# Patient Record
Sex: MALE | Race: WHITE | NOT HISPANIC OR LATINO | Employment: UNEMPLOYED | URBAN - METROPOLITAN AREA
[De-identification: names, ages, dates, MRNs, and addresses within clinical notes are randomized per-mention and may not be internally consistent; named-entity substitution may affect disease eponyms.]

---

## 2020-07-20 PROBLEM — M48.061 SPINAL STENOSIS OF LUMBAR REGION WITHOUT NEUROGENIC CLAUDICATION: Status: ACTIVE | Noted: 2020-07-20

## 2020-07-20 PROBLEM — M54.10 RADICULOPATHY OF LEG: Status: ACTIVE | Noted: 2020-07-20

## 2020-08-24 ENCOUNTER — HISTORICAL (OUTPATIENT)
Dept: RADIOLOGY | Facility: HOSPITAL | Age: 60
End: 2020-08-24

## 2020-09-10 ENCOUNTER — HISTORICAL (OUTPATIENT)
Dept: PHYSICAL THERAPY | Facility: HOSPITAL | Age: 60
End: 2020-09-10

## 2020-09-16 ENCOUNTER — HISTORICAL (OUTPATIENT)
Dept: PHYSICAL THERAPY | Facility: HOSPITAL | Age: 60
End: 2020-09-16

## 2020-09-18 ENCOUNTER — HISTORICAL (OUTPATIENT)
Dept: PHYSICAL THERAPY | Facility: HOSPITAL | Age: 60
End: 2020-09-18

## 2020-09-22 ENCOUNTER — HISTORICAL (OUTPATIENT)
Dept: PHYSICAL THERAPY | Facility: HOSPITAL | Age: 60
End: 2020-09-22

## 2020-09-24 ENCOUNTER — HISTORICAL (OUTPATIENT)
Dept: PHYSICAL THERAPY | Facility: HOSPITAL | Age: 60
End: 2020-09-24

## 2020-09-29 ENCOUNTER — HISTORICAL (OUTPATIENT)
Dept: PHYSICAL THERAPY | Facility: HOSPITAL | Age: 60
End: 2020-09-29

## 2020-10-01 ENCOUNTER — HISTORICAL (OUTPATIENT)
Dept: PHYSICAL THERAPY | Facility: HOSPITAL | Age: 60
End: 2020-10-01

## 2020-10-06 ENCOUNTER — HISTORICAL (OUTPATIENT)
Dept: PHYSICAL THERAPY | Facility: HOSPITAL | Age: 60
End: 2020-10-06

## 2020-10-08 ENCOUNTER — HISTORICAL (OUTPATIENT)
Dept: PHYSICAL THERAPY | Facility: HOSPITAL | Age: 60
End: 2020-10-08

## 2020-10-13 ENCOUNTER — HISTORICAL (OUTPATIENT)
Dept: PHYSICAL THERAPY | Facility: HOSPITAL | Age: 60
End: 2020-10-13

## 2020-10-15 ENCOUNTER — HISTORICAL (OUTPATIENT)
Dept: PHYSICAL THERAPY | Facility: HOSPITAL | Age: 60
End: 2020-10-15

## 2021-07-29 ENCOUNTER — HISTORICAL (OUTPATIENT)
Dept: WOUND CARE | Facility: HOSPITAL | Age: 61
End: 2021-07-29

## 2021-10-04 LAB — CRC RECOMMENDATION EXT: NORMAL

## 2022-04-09 ENCOUNTER — HISTORICAL (OUTPATIENT)
Dept: ADMINISTRATIVE | Facility: HOSPITAL | Age: 62
End: 2022-04-09

## 2022-04-25 VITALS
BODY MASS INDEX: 22.56 KG/M2 | HEIGHT: 67 IN | SYSTOLIC BLOOD PRESSURE: 119 MMHG | DIASTOLIC BLOOD PRESSURE: 74 MMHG | WEIGHT: 143.75 LBS | OXYGEN SATURATION: 99 %
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
## 2022-07-06 ENCOUNTER — PATIENT OUTREACH (OUTPATIENT)
Dept: ADMINISTRATIVE | Facility: HOSPITAL | Age: 62
End: 2022-07-06
Payer: MEDICAID

## 2022-07-06 NOTE — PROGRESS NOTES
Population Health Outreach. The following record(s)  below were uploaded for Health Maintenance .      COLONOSCOPY     10/04/2021

## 2022-07-11 DIAGNOSIS — Z00.00 ENCOUNTER FOR WELLNESS EXAMINATION IN ADULT: Primary | ICD-10-CM

## 2022-09-19 ENCOUNTER — TELEPHONE (OUTPATIENT)
Dept: FAMILY MEDICINE | Facility: CLINIC | Age: 62
End: 2022-09-19
Payer: MEDICAID

## 2022-09-19 ENCOUNTER — LAB VISIT (OUTPATIENT)
Dept: LAB | Facility: HOSPITAL | Age: 62
End: 2022-09-19
Attending: NURSE PRACTITIONER
Payer: MEDICAID

## 2022-09-19 DIAGNOSIS — Z00.00 ENCOUNTER FOR WELLNESS EXAMINATION IN ADULT: ICD-10-CM

## 2022-09-19 LAB
ALBUMIN SERPL-MCNC: 4.1 GM/DL (ref 3.4–4.8)
ALBUMIN/GLOB SERPL: 1.1 RATIO (ref 1.1–2)
ALP SERPL-CCNC: 103 UNIT/L (ref 40–150)
ALT SERPL-CCNC: 16 UNIT/L (ref 0–55)
AST SERPL-CCNC: 18 UNIT/L (ref 5–34)
BASOPHILS # BLD AUTO: 0.03 X10(3)/MCL (ref 0–0.2)
BASOPHILS NFR BLD AUTO: 0.7 %
BILIRUBIN DIRECT+TOT PNL SERPL-MCNC: 0.7 MG/DL
BUN SERPL-MCNC: 9.4 MG/DL (ref 8.4–25.7)
CALCIUM SERPL-MCNC: 10.3 MG/DL (ref 8.8–10)
CHLORIDE SERPL-SCNC: 104 MMOL/L (ref 98–107)
CHOLEST SERPL-MCNC: 149 MG/DL
CHOLEST/HDLC SERPL: 4 {RATIO} (ref 0–5)
CO2 SERPL-SCNC: 27 MMOL/L (ref 23–31)
CREAT SERPL-MCNC: 0.83 MG/DL (ref 0.73–1.18)
DEPRECATED CALCIDIOL+CALCIFEROL SERPL-MC: 61.2 NG/ML (ref 30–80)
EOSINOPHIL # BLD AUTO: 0.41 X10(3)/MCL (ref 0–0.9)
EOSINOPHIL NFR BLD AUTO: 9.8 %
ERYTHROCYTE [DISTWIDTH] IN BLOOD BY AUTOMATED COUNT: 11.8 % (ref 11.5–17)
GFR SERPLBLD CREATININE-BSD FMLA CKD-EPI: >60 MLS/MIN/1.73/M2
GLOBULIN SER-MCNC: 3.9 GM/DL (ref 2.4–3.5)
GLUCOSE SERPL-MCNC: 110 MG/DL (ref 82–115)
HCT VFR BLD AUTO: 48.9 % (ref 42–52)
HDLC SERPL-MCNC: 41 MG/DL (ref 35–60)
HGB BLD-MCNC: 15.7 GM/DL (ref 14–18)
IMM GRANULOCYTES # BLD AUTO: 0 X10(3)/MCL (ref 0–0.04)
IMM GRANULOCYTES NFR BLD AUTO: 0 %
LDLC SERPL CALC-MCNC: 91 MG/DL (ref 50–140)
LYMPHOCYTES # BLD AUTO: 1.59 X10(3)/MCL (ref 0.6–4.6)
LYMPHOCYTES NFR BLD AUTO: 37.9 %
MCH RBC QN AUTO: 32.4 PG (ref 27–31)
MCHC RBC AUTO-ENTMCNC: 32.1 MG/DL (ref 33–36)
MCV RBC AUTO: 101 FL (ref 80–94)
MONOCYTES # BLD AUTO: 0.34 X10(3)/MCL (ref 0.1–1.3)
MONOCYTES NFR BLD AUTO: 8.1 %
NEUTROPHILS # BLD AUTO: 1.8 X10(3)/MCL (ref 2.1–9.2)
NEUTROPHILS NFR BLD AUTO: 43.5 %
PLATELET # BLD AUTO: 243 X10(3)/MCL (ref 130–400)
PMV BLD AUTO: 9.2 FL (ref 7.4–10.4)
POTASSIUM SERPL-SCNC: 4.7 MMOL/L (ref 3.5–5.1)
PROT SERPL-MCNC: 8 GM/DL (ref 5.8–7.6)
PSA SERPL-MCNC: 1.18 NG/ML
RBC # BLD AUTO: 4.84 X10(6)/MCL (ref 4.7–6.1)
SODIUM SERPL-SCNC: 140 MMOL/L (ref 136–145)
TRIGL SERPL-MCNC: 87 MG/DL (ref 34–140)
TSH SERPL-ACNC: 1.11 UIU/ML (ref 0.35–4.94)
VLDLC SERPL CALC-MCNC: 17 MG/DL
WBC # SPEC AUTO: 4.2 X10(3)/MCL (ref 4.5–11.5)

## 2022-09-19 PROCEDURE — 36415 COLL VENOUS BLD VENIPUNCTURE: CPT

## 2022-09-19 PROCEDURE — 82306 VITAMIN D 25 HYDROXY: CPT

## 2022-09-19 PROCEDURE — 84443 ASSAY THYROID STIM HORMONE: CPT

## 2022-09-19 PROCEDURE — 84153 ASSAY OF PSA TOTAL: CPT

## 2022-09-19 PROCEDURE — 85025 COMPLETE CBC W/AUTO DIFF WBC: CPT

## 2022-09-19 PROCEDURE — 80053 COMPREHEN METABOLIC PANEL: CPT

## 2022-09-19 PROCEDURE — 80061 LIPID PANEL: CPT

## 2022-09-19 NOTE — TELEPHONE ENCOUNTER
Patient called for samples of Lisinopril 10mg.  His insurance will not go into effect until October 1st.  Instructed we do not provide samples but he can try using Good RX card at the pharmacy.  Agrees and voices understanding.

## 2022-09-19 NOTE — TELEPHONE ENCOUNTER
----- Message from Yasmin Mckeon sent at 9/19/2022  9:56 AM CDT -----  Regarding: rx refill unaffordable  Type:  Needs Medical Advice    Who Called: patient  Symptoms (please be specific):    How long has patient had these symptoms:    Pharmacy name and phone #:  [facility]  Would the patient rather a call back or a response via [facility]?   Best Call Back Number: [phone] or [phone]  Additional Information: Patient is not able to afford his medication at this time bc of insurance change. Patient is feeling sick without his medication. Insurance will not go into effect until 10/01/22. Does the clinic have any samples of lisinopriL 10 MG tablet? Please call patient back.

## 2022-09-22 ENCOUNTER — OFFICE VISIT (OUTPATIENT)
Dept: FAMILY MEDICINE | Facility: CLINIC | Age: 62
End: 2022-09-22
Payer: MEDICARE

## 2022-09-22 VITALS
OXYGEN SATURATION: 98 % | SYSTOLIC BLOOD PRESSURE: 130 MMHG | WEIGHT: 137.63 LBS | HEART RATE: 67 BPM | DIASTOLIC BLOOD PRESSURE: 88 MMHG | BODY MASS INDEX: 22.12 KG/M2 | HEIGHT: 66 IN | RESPIRATION RATE: 18 BRPM

## 2022-09-22 DIAGNOSIS — R79.9 ABNORMAL FINDING OF BLOOD CHEMISTRY, UNSPECIFIED: ICD-10-CM

## 2022-09-22 DIAGNOSIS — Z13.29 THYROID DISORDER SCREENING: ICD-10-CM

## 2022-09-22 DIAGNOSIS — Z12.5 ENCOUNTER FOR SCREENING FOR MALIGNANT NEOPLASM OF PROSTATE: ICD-10-CM

## 2022-09-22 DIAGNOSIS — N13.8 BPH WITH URINARY OBSTRUCTION: ICD-10-CM

## 2022-09-22 DIAGNOSIS — E55.9 VITAMIN D DEFICIENCY: ICD-10-CM

## 2022-09-22 DIAGNOSIS — I10 HYPERTENSION, UNSPECIFIED TYPE: ICD-10-CM

## 2022-09-22 DIAGNOSIS — N40.1 BPH WITH URINARY OBSTRUCTION: ICD-10-CM

## 2022-09-22 DIAGNOSIS — J30.2 SEASONAL ALLERGIES: ICD-10-CM

## 2022-09-22 DIAGNOSIS — Z72.0 TOBACCO USER: ICD-10-CM

## 2022-09-22 DIAGNOSIS — Z00.00 ENCOUNTER FOR MEDICARE ANNUAL WELLNESS EXAM: Primary | ICD-10-CM

## 2022-09-22 DIAGNOSIS — Z13.1 DIABETES MELLITUS SCREENING: ICD-10-CM

## 2022-09-22 DIAGNOSIS — K21.9 GASTROESOPHAGEAL REFLUX DISEASE, UNSPECIFIED WHETHER ESOPHAGITIS PRESENT: ICD-10-CM

## 2022-09-22 DIAGNOSIS — G89.29 CHRONIC LEFT-SIDED LOW BACK PAIN WITH LEFT-SIDED SCIATICA: ICD-10-CM

## 2022-09-22 DIAGNOSIS — M54.42 CHRONIC LEFT-SIDED LOW BACK PAIN WITH LEFT-SIDED SCIATICA: ICD-10-CM

## 2022-09-22 DIAGNOSIS — Z87.891 PERSONAL HISTORY OF TOBACCO USE, PRESENTING HAZARDS TO HEALTH: ICD-10-CM

## 2022-09-22 DIAGNOSIS — M75.100 ROTATOR CUFF SYNDROME, UNSPECIFIED LATERALITY: ICD-10-CM

## 2022-09-22 DIAGNOSIS — Z86.010 HISTORY OF COLONIC POLYPS: ICD-10-CM

## 2022-09-22 DIAGNOSIS — Z13.6 ENCOUNTER FOR LIPID SCREENING FOR CARDIOVASCULAR DISEASE: ICD-10-CM

## 2022-09-22 DIAGNOSIS — Z13.220 ENCOUNTER FOR LIPID SCREENING FOR CARDIOVASCULAR DISEASE: ICD-10-CM

## 2022-09-22 PROCEDURE — G0439 PR MEDICARE ANNUAL WELLNESS SUBSEQUENT VISIT: ICD-10-PCS | Mod: ,,, | Performed by: PHYSICIAN ASSISTANT

## 2022-09-22 PROCEDURE — G0439 PPPS, SUBSEQ VISIT: HCPCS | Mod: ,,, | Performed by: PHYSICIAN ASSISTANT

## 2022-09-22 RX ORDER — PANTOPRAZOLE SODIUM 40 MG/1
40 TABLET, DELAYED RELEASE ORAL DAILY
Qty: 90 TABLET | Refills: 1 | Status: SHIPPED | OUTPATIENT
Start: 2022-09-22 | End: 2023-09-25 | Stop reason: SDUPTHER

## 2022-09-22 RX ORDER — FINASTERIDE 5 MG/1
5 TABLET, FILM COATED ORAL DAILY
COMMUNITY
Start: 2021-07-29 | End: 2023-09-25

## 2022-09-22 RX ORDER — DULOXETIN HYDROCHLORIDE 20 MG/1
20 CAPSULE, DELAYED RELEASE ORAL 2 TIMES DAILY
Qty: 180 CAPSULE | Refills: 1 | Status: SHIPPED | OUTPATIENT
Start: 2022-09-22 | End: 2023-09-25

## 2022-09-22 RX ORDER — LORATADINE 10 MG/1
TABLET ORAL
Qty: 90 TABLET | Refills: 1 | OUTPATIENT
Start: 2022-09-22 | End: 2023-08-16 | Stop reason: SDUPTHER

## 2022-09-22 RX ORDER — ALFUZOSIN HYDROCHLORIDE 10 MG/1
10 TABLET, EXTENDED RELEASE ORAL
COMMUNITY
Start: 2022-04-28 | End: 2023-09-25

## 2022-09-22 RX ORDER — GABAPENTIN 300 MG/1
300 CAPSULE ORAL 3 TIMES DAILY
COMMUNITY
Start: 2022-08-31 | End: 2022-09-22 | Stop reason: SDUPTHER

## 2022-09-22 RX ORDER — LORATADINE 10 MG/1
TABLET ORAL
COMMUNITY
End: 2022-09-22 | Stop reason: SDUPTHER

## 2022-09-22 RX ORDER — DULOXETIN HYDROCHLORIDE 20 MG/1
20 CAPSULE, DELAYED RELEASE ORAL 2 TIMES DAILY
COMMUNITY
Start: 2022-07-27 | End: 2022-09-22 | Stop reason: SDUPTHER

## 2022-09-22 RX ORDER — LISINOPRIL 10 MG/1
10 TABLET ORAL DAILY
Qty: 30 TABLET | Refills: 0 | Status: SHIPPED | OUTPATIENT
Start: 2022-09-22 | End: 2023-03-23

## 2022-09-22 RX ORDER — TRAMADOL HYDROCHLORIDE 50 MG/1
TABLET ORAL
COMMUNITY
End: 2023-02-09

## 2022-09-22 RX ORDER — GABAPENTIN 300 MG/1
300 CAPSULE ORAL 3 TIMES DAILY
Qty: 270 CAPSULE | Refills: 1 | Status: SHIPPED | OUTPATIENT
Start: 2022-09-22 | End: 2023-09-25 | Stop reason: SDUPTHER

## 2022-09-22 RX ORDER — PANTOPRAZOLE SODIUM 40 MG/1
40 TABLET, DELAYED RELEASE ORAL
COMMUNITY
Start: 2022-01-19 | End: 2022-09-22 | Stop reason: SDUPTHER

## 2022-09-22 NOTE — PROGRESS NOTES
Patient Information      Patient Name: Teo Santiago     Date of service:  9/22/22      Member ID: [id number] - (Medicare)    YOB: 1960   Gender: male   Race: White      Ethnicity: Not  or /a   Medical Record Number: [id number]      History of Present Illness      Chief Complaint: wellness (Discuss lab results)    HPI:  Patient is a 61 y.o. year old male who presents to clinic for annual Medicare Wellness examination. Patient has past medical history of hypertension, seasonal allergies, chronic back pain, and depression. Diet described as on healthy. Stays active throughout the day. Wears dentures. Sleep quality described as good. Current smoker 1/2 PPD for the past 40 years).  Denies alcohol use. States  he needs to schedule for annual eye exam.    Review of Systems:  General: Denies fever, chills, fatigue, myalgias, and change in appetite   Eyes: Denies change in vision, eye redness, eye drainage, eye pain  ENT: Denies ear pain or pressure, rhinorrhea, nasal congestion, sore throat, and trouble swallowing  Resp: Denies wheezing, and shortness of breath   Cardio: Denies chest pain, palpitations, pleuritic pain, and edema   GI: Denies nausea, vomiting, diarrhea, and abdominal pain   : Denies dysuria, hematuria, and discharge   MSK: Denies trauma, joint pain, and trouble ambulating   Neuro: Denies LOC, dizziness, seizure like activity, and focal deficits   Skin: Denies rashes, open lesions and ulcers      Previous History    Patient Care Team:  Primary Doctor No as PCP - General     Review of patient's allergies indicates:  No Known Allergies  Outpatient Medications Marked as Taking for the 9/22/22 encounter (Office Visit) with MARISELA OTTO [facility]   Medication Sig Dispense Refill    alfuzosin (UROXATRAL) 10 mg Tb24       finasteride (PROSCAR) 5 mg tablet       traMADoL (ULTRAM) 50 mg tablet TAKE 1 TABLET BY MOUTH EVERY 4 HOURS AS NEEDED FOR PAIN FOR 5 DAYS       [DISCONTINUED] DULoxetine (CYMBALTA) 20 MG capsule Take 20 mg by mouth 2 (two) times daily.      [DISCONTINUED] gabapentin (NEURONTIN) 300 MG capsule Take 300 mg by mouth 3 (three) times daily.      [DISCONTINUED] lisinopriL 10 MG tablet Take 1 tablet (10 mg total) by mouth once daily. 30 tablet 0    [DISCONTINUED] loratadine (CLARITIN) 10 mg tablet TAKE ONE TABLET BY MOUTH DAILY FOR ALLERGY SYMPTOMS      [DISCONTINUED] pantoprazole (PROTONIX) 40 MG tablet Take 40 mg by mouth.       Past Medical History:   Diagnosis Date    BPH (benign prostatic hyperplasia)     GERD (gastroesophageal reflux disease)     Hypertension      Current Outpatient Medications   Medication Instructions    alfuzosin (UROXATRAL) 10 mg Tb24 No dose, route, or frequency recorded.    DULoxetine (CYMBALTA) 20 mg, Oral, 2 times daily    finasteride (PROSCAR) 5 mg tablet No dose, route, or frequency recorded.    gabapentin (NEURONTIN) 300 mg, Oral, 3 times daily    lisinopriL 10 mg, Oral, Daily    loratadine (CLARITIN) 10 mg tablet TAKE ONE TABLET BY MOUTH DAILY FOR ALLERGY SYMPTOMS<BR>Strength: 10 mg    pantoprazole (PROTONIX) 40 mg, Oral, Daily    traMADoL (ULTRAM) 50 mg tablet TAKE 1 TABLET BY MOUTH EVERY 4 HOURS AS NEEDED FOR PAIN FOR 5 DAYS     Past Surgical History:   Procedure Laterality Date    SPINE SURGERY  06/09/2020    METRX DLL LEFT L4/5, 06/09/20, [facility], DR. PONCE.    TONSILLECTOMY        History reviewed. No pertinent family history.    Social History     Tobacco Use    Smoking status: Every Day     Packs/day: 0.50     Years: 38.00     Pack years: 19.00     Types: Cigarettes    Smokeless tobacco: Never   Substance Use Topics    Alcohol use: Yes     Comment: socially    Drug use: Not Currently     Types: Cocaine, Methamphetamines      Health Maintenance      Health Maintenance   Topic Date Due    Hepatitis C Screening  Never done    Lipid Panel  09/19/2027    TETANUS VACCINE  07/28/2031      Patient Reported Health Risk Assessment       What is your age?: 65-69 (61)  Are you male or female?: Male  During the past four weeks, how much have you been bothered by emotional problems such as feeling anxious, depressed, irritable, sad, or downhearted and blue?: Not at all  During the past five weeks, has your physical and/or emotional health limited your social activities with family, friends, neighbors, or groups?: Not at all  During the past four weeks, how much bodily pain have you generally had?: Very mild pain  During the past four weeks, was someone available to help if you needed and wanted help?: Yes, quite a bit  During the past four weeks, what was the hardest physical activity you could do for at least two minutes?: Light  Can you get to places out of walking distance without help?  (For example, can you travel alone on buses or taxis, or drive your own car?): Yes  Can you go shopping for groceries or clothes without someone's help?: Yes  Can you prepare your own meals?: Yes  Can you do your own housework without help?: Yes  Because of any health problems, do you need the help of another person with your personal care needs such as eating, bathing, dressing, or getting around the house?: No  Can you handle your own money without help?: Yes  During the past four weeks, how would you rate your health in general?: Very good  How have things been going for you during the past four weeks?: Pretty well  Are you having difficulties driving your car?: No  Do you always fasten your seat belt when you are in a car?: Yes, usually  How often in the past four weeks have you been bothered by falling or dizzy when standing up?: Never  How often in the past four weeks have you been bothered by sexual problems?: Sometimes  How often in the past four weeks have you been bothered by trouble eating well?: Never  How often in the past four weeks have you been bothered by teeth or denture problems?: Never  How often in the past four weeks have you been bothered  with problems using the telephone?: Never  How often in the past four weeks have you been bothered by tiredness or fatigue?: Never  Have you fallen two or more times in the past year?: Yes  Are you afraid of falling?: No  Are you a smoker?: Yes, I'm not ready to quit  During the past four weeks, how many drinks of wine, beer, or other alcoholic beverages did you have?: 2-5 drinks per weeks  Do you exercise for about 20 minutes three or more days a week?: No, I usually do not exercise this much  Have you been given any information to help you with hazards in your house that might hurt you?: No  Have you been given any information to help you with keeping track of your medications?: No  How often do you have trouble taking medicines the way you've been told to take them?: I always take them as prescribed  How confident are you that you can control and manage most of your health problems?: Very confident  What is your race? (Check all that apply.):     No flowsheet data found.  Fall Risk Assessment - Outpatient 9/22/2022   Mobility Status Ambulatory   Number of falls 2+   Identified as fall risk 1             Depression Screening  Over the past two weeks, has the patient felt down, depressed, or hopeless?: No  Over the past two weeks, has the patient felt little interest or pleasure in doing things?: No  Functional Ability/Safety Screening  Was the patient's timed Up & Go test unsteady or longer than 30 seconds?: No  Does the patient need help with phone, transportation, shopping, preparing meals, housework, laundry, meds, or managing money?: No  Does the patient's home have rugs in the hallway, lack grab bars in the bathroom, lack handrails on the stairs or have poor lighting?: No  Have you noticed any hearing difficulties?: No  Cognitive Function (Assessed through direct observation with due consideration of information obtained by way of patient reports and/or concerns raised by family, friends, caretakers,  or others)    Does the patient repeat questions/statements in the same day?: No  Does the patient have trouble remembering the date, year, and time?: No  Does the patient have difficulty managing finances?: No  Does the patient have a decreased sense of direction?: No    Opioid Screening: Patient medication list reviewed, patient is not taking prescription opioids. Patient is not using additional opioids than prescribed. Patient is at low risk of substance abuse based on this opioid use history.      Screening      Prostate Cancer Screening:   PSA performed and listed below  Lab Results   Component Value Date    PSA 1.18 09/19/2022     Colorectal Cancer Screening:   The most recent Colorectal Cancer Screening test was abnormal and personal history of polyps or colon cancer. Colonoscopy performed 10/04/2021. Reccomended repeat colonoscopy at a 3-year interval given the poor prep and presence of polyps.    Lung Cancer Screening:   The patient has the following risk factor(s) for Lung Cancer: patient is between 50-80 years of age, has 20+ pack years of tobacco smoking, no CT of chest in the previous 24 months, and will order and schedule low dose CT.    Social History     Tobacco Use   Smoking Status Every Day    Packs/day: 0.50    Years: 38.00    Pack years: 19.00    Types: Cigarettes   Smokeless Tobacco Never      Cardiovascular Screening:  The patient has the following risk factor(s) for Cardiovascular Disease: patient's blood pressure is controlled and currently not on lipid lowering medications  Lab Results   Component Value Date    CHOL 149 09/19/2022    TRIG 87 09/19/2022    HDL 41 09/19/2022    TOTALCHOLEST 4 09/19/2022      Advanced directives (Living Will)     During this visit, I engaged the patient in the advance care planning process.  The patient and I reviewed the role for advance directives and their purpose in directing future healthcare if the patients unable to speak for him/herself.  At this point  "in time, the patient does have full decision-making capacity. We discussed different extreme health states that he could experience, and reviewed what kind of medical care he would want in those situations. The patient communicated that if he were comatose and had little chance of a meaningful recovery, he would not want machines/life-sustaining treatments used.  The patient has not completed a living will to reflect these preferences.  The patient has not already designated a healthcare power of  to make decisions on his behalf. I spent a total of 5 minutes engaging the patient in this advance care planning discussion.     Physical Exam      Vital Signs Reviewed   /88   Pulse 67   Resp 18   Ht 5' 6" (1.676 m)   Wt 62.4 kg (137 lb 9.6 oz)   SpO2 98%   BMI 22.21 kg/m²     Physical Exam:  General: Alert, well nourished, no acute distress, non-toxic appearing.   Eyes: Anicteric sclera, without conjunctival injection, normal lids, no purulent drainage, EOMs grossly intact.   Ears: No tragal tenderness. Tympanic membranes intact, pearly grey, without effusion or erythema and with a positive light reflex.   Mouth: Posterior pharynx without erythema. No exudate, ulcerations, or lesion. No tonsillar swelling.   Neck: Supple, full ROM, no rigidity, no cervical adenopathy.   Cardio: Normal rate and rhythm without murmurs, gallops, or rubs.   Resp: Respirations even and unlabored, clear to auscultation bilaterally.   Skin: No rashes or open lesions noted.   MSK: No swelling. No abrasions or signs of trauma. Ambulating without assistance.   Neuro: CN1-12 intact grossly. No focal deficits noted. Facial expressions even.     How is your physical exam and mental status from last year? unchanged     Labs     Results for orders placed or performed in visit on 09/19/22   Comprehensive Metabolic Panel   Result Value Ref Range    Sodium Level 140 136 - 145 mmol/L    Potassium Level 4.7 3.5 - 5.1 mmol/L    Chloride " 104 98 - 107 mmol/L    Carbon Dioxide 27 23 - 31 mmol/L    Glucose Level 110 82 - 115 mg/dL    Blood Urea Nitrogen 9.4 8.4 - 25.7 mg/dL    Creatinine 0.83 0.73 - 1.18 mg/dL    Calcium Level Total 10.3 (H) 8.8 - 10.0 mg/dL    Protein Total 8.0 (H) 5.8 - 7.6 gm/dL    Albumin Level 4.1 3.4 - 4.8 gm/dL    Globulin 3.9 (H) 2.4 - 3.5 gm/dL    Albumin/Globulin Ratio 1.1 1.1 - 2.0 ratio    Bilirubin Total 0.7 <=1.5 mg/dL    Alkaline Phosphatase 103 40 - 150 unit/L    Alanine Aminotransferase 16 0 - 55 unit/L    Aspartate Aminotransferase 18 5 - 34 unit/L    eGFR >60 mls/min/1.73/m2   Lipid Panel   Result Value Ref Range    Cholesterol Total 149 <=200 mg/dL    HDL Cholesterol 41 35 - 60 mg/dL    Triglyceride 87 34 - 140 mg/dL    Cholesterol/HDL Ratio 4 0 - 5    Very Low Density Lipoprotein 17     LDL Cholesterol 91.00 50.00 - 140.00 mg/dL   Vitamin D   Result Value Ref Range    Vit D 25 OH 61.2 30.0 - 80.0 ng/mL   TSH   Result Value Ref Range    Thyroid Stimulating Hormone 1.1060 0.3500 - 4.9400 uIU/mL   PSA, Screening   Result Value Ref Range    Prostate Specific Antigen 1.18 <=4.00 ng/mL   CBC with Differential   Result Value Ref Range    WBC 4.2 (L) 4.5 - 11.5 x10(3)/mcL    RBC 4.84 4.70 - 6.10 x10(6)/mcL    Hgb 15.7 14.0 - 18.0 gm/dL    Hct 48.9 42.0 - 52.0 %    .0 (H) 80.0 - 94.0 fL    MCH 32.4 (H) 27.0 - 31.0 pg    MCHC 32.1 (L) 33.0 - 36.0 mg/dL    RDW 11.8 11.5 - 17.0 %    Platelet 243 130 - 400 x10(3)/mcL    MPV 9.2 7.4 - 10.4 fL    Neut % 43.5 %    Lymph % 37.9 %    Mono % 8.1 %    Eos % 9.8 %    Basophil % 0.7 %    Lymph # 1.59 0.6 - 4.6 x10(3)/mcL    Neut # 1.8 (L) 2.1 - 9.2 x10(3)/mcL    Mono # 0.34 0.1 - 1.3 x10(3)/mcL    Eos # 0.41 0 - 0.9 x10(3)/mcL    Baso # 0.03 0 - 0.2 x10(3)/mcL    IG# 0.00 0 - 0.04 x10(3)/mcL    IG% 0.0 %      Assessment/Plan        1. Encounter for Medicare annual wellness exam   Overall health status reviewed. Significant chronic conditions addressed, including ongoing treatment  plans. Good health habits reinforced. Cardiovascular disease risk factors discussed.  Appropriate recommendations and preventative care medical information provided with annual wellness exams encouraged.     2. BPH with urinary obstruction   Uroxatral and finasteride  Continue follow-up and recommendations per Urology Dr. Alegre  Follow-up with urology on 11/03/2022 as planned     3. Hypertension, unspecified type   Denies headaches, blurred vision, dizziness, chest pain, SOB, headache, vision changes, palpitations, and  peripheral edema.    Controlled with medication. Compliant with medication. Continue medication daily. Low-salt/DASH diet. Discussed lifestyle modifications. Encouraged aerobic exercise at least 30 minutes a day. Monitor BP with daily blood pressure goal less than 130/90.      4. Tobacco user   Current smoker 1/2 PPD for the past 40 years).    Low-dose lung CT discussed and ordered in clinic today     5. History of colonic polyps   Colonoscopy performed 10/04/2021. Reccomended repeat colonoscopy at a 3-year interval given the poor prep and presence of polyps.     6. Chronic left-sided low back pain with left-sided sciatica  Reports chronic low back pain with occasional left-sided sciatica. Surgery performed 2019 by Dr. Olguin.  Denies cauda equina symptoms.     7. Rotator cuff syndrome  Continue follow-up and recommendations per Orthopedics at [facility].         Follow-up in six months.  Annual exam scheduled.  Labs will need to be ordered at next visit.    Orders Placed This Encounter    CT Chest Low Dose Diagnostic    loratadine (CLARITIN) 10 mg tablet    lisinopriL 10 MG tablet    DULoxetine (CYMBALTA) 20 MG capsule    gabapentin (NEURONTIN) 300 MG capsule    pantoprazole (PROTONIX) 40 MG tablet      Medication List with Changes/Refills   Current Medications    ALFUZOSIN (UROXATRAL) 10 MG TB24        FINASTERIDE (PROSCAR) 5 MG TABLET        TRAMADOL (ULTRAM) 50 MG TABLET    TAKE 1 TABLET BY MOUTH EVERY 4  HOURS AS NEEDED FOR PAIN FOR 5 DAYS   Changed and/or Refilled Medications    Modified Medication Previous Medication    DULOXETINE (CYMBALTA) 20 MG CAPSULE DULoxetine (CYMBALTA) 20 MG capsule       Take 1 capsule (20 mg total) by mouth 2 (two) times daily.    Take 20 mg by mouth 2 (two) times daily.    GABAPENTIN (NEURONTIN) 300 MG CAPSULE gabapentin (NEURONTIN) 300 MG capsule       Take 1 capsule (300 mg total) by mouth 3 (three) times daily.    Take 300 mg by mouth 3 (three) times daily.    LISINOPRIL 10 MG TABLET lisinopriL 10 MG tablet       Take 1 tablet (10 mg total) by mouth once daily.    Take 1 tablet (10 mg total) by mouth once daily.    LORATADINE (CLARITIN) 10 MG TABLET loratadine (CLARITIN) 10 mg tablet       TAKE ONE TABLET BY MOUTH DAILY FOR ALLERGY SYMPTOMS  Strength: 10 mg    TAKE ONE TABLET BY MOUTH DAILY FOR ALLERGY SYMPTOMS    PANTOPRAZOLE (PROTONIX) 40 MG TABLET pantoprazole (PROTONIX) 40 MG tablet       Take 1 tablet (40 mg total) by mouth once daily.    Take 40 mg by mouth.     Medicare Annual Wellness and Personalized Prevention Plan:   Fall Risk + Home Safety + Hearing Impairment + Depression Screen + Cognitive Impairment Screen + Health Risk Assessment all reviewed.     Health Maintenance Topics with due status: Not Due       Topic Last Completion Date    TETANUS VACCINE 07/28/2021    Colorectal Cancer Screening 10/04/2021    Lipid Panel 09/19/2022      The patient's Health Maintenance was reviewed and the following appears to be due at this time:   Health Maintenance Due   Topic Date Due    Hepatitis C Screening  Never done    Pneumococcal Vaccines (Age 0-64) (1 - PCV) Never done    HIV Screening  Never done    Shingles Vaccine (1 of 2) Never done    COVID-19 Vaccine (4 - Booster) 04/21/2022    Influenza Vaccine (1) Never done     Advance Care Planning   I attest to discussing Advance Care Planning with patient and/or family member.  Education was provided including the importance of the  Health Care Power of , Advance Directives, and/or [place] documentation.  The patient expressed understanding to the importance of this information and discussion.       Follow up in about 6 months (around 3/22/2023). In addition to their scheduled follow up, the patient has also been instructed to follow up on as needed basis.     Make sure to schedule all health maintenance appointments to achieve health care goals.   Annual check up is due every 12 months with your designated provider/care team.    Kristyn Everett PA-C    Future Appointments   Date Time Provider Department Center   11/3/2022  8:00 AM Yasmin Lugo DO [facility]    3/22/2023  8:40 AM ANDREW Hughes   9/20/2023  8:20 AM ANDREW Hughes

## 2022-10-21 PROBLEM — Z87.891 PERSONAL HISTORY OF TOBACCO USE, PRESENTING HAZARDS TO HEALTH: Status: ACTIVE | Noted: 2022-10-21

## 2022-11-01 ENCOUNTER — HOSPITAL ENCOUNTER (OUTPATIENT)
Dept: RADIOLOGY | Facility: HOSPITAL | Age: 62
Discharge: HOME | End: 2022-11-01
Attending: PHYSICIAN ASSISTANT
Payer: MEDICARE

## 2022-11-01 DIAGNOSIS — Z87.891 PERSONAL HISTORY OF TOBACCO USE, PRESENTING HAZARDS TO HEALTH: ICD-10-CM

## 2022-11-01 PROCEDURE — 71271 CT THORAX LUNG CANCER SCR C-: CPT | Mod: TC

## 2022-11-03 ENCOUNTER — OFFICE VISIT (OUTPATIENT)
Dept: UROLOGY | Facility: CLINIC | Age: 62
End: 2022-11-03
Payer: MEDICARE

## 2022-11-03 VITALS
HEART RATE: 78 BPM | HEIGHT: 66 IN | OXYGEN SATURATION: 100 % | DIASTOLIC BLOOD PRESSURE: 89 MMHG | WEIGHT: 138 LBS | TEMPERATURE: 98 F | SYSTOLIC BLOOD PRESSURE: 138 MMHG | RESPIRATION RATE: 18 BRPM | BODY MASS INDEX: 22.18 KG/M2

## 2022-11-03 DIAGNOSIS — N40.0 BENIGN PROSTATIC HYPERPLASIA, UNSPECIFIED WHETHER LOWER URINARY TRACT SYMPTOMS PRESENT: Primary | ICD-10-CM

## 2022-11-03 DIAGNOSIS — N39.41 URGE INCONTINENCE: ICD-10-CM

## 2022-11-03 LAB — POC RESIDUAL URINE VOLUME: 129 ML (ref 0–100)

## 2022-11-03 PROCEDURE — 51798 US URINE CAPACITY MEASURE: CPT | Mod: PBBFAC | Performed by: UROLOGY

## 2022-11-03 PROCEDURE — 3008F BODY MASS INDEX DOCD: CPT | Mod: CPTII,,, | Performed by: UROLOGY

## 2022-11-03 PROCEDURE — 4010F ACE/ARB THERAPY RXD/TAKEN: CPT | Mod: CPTII,,, | Performed by: UROLOGY

## 2022-11-03 PROCEDURE — 3075F PR MOST RECENT SYSTOLIC BLOOD PRESS GE 130-139MM HG: ICD-10-PCS | Mod: CPTII,,, | Performed by: UROLOGY

## 2022-11-03 PROCEDURE — 3075F SYST BP GE 130 - 139MM HG: CPT | Mod: CPTII,,, | Performed by: UROLOGY

## 2022-11-03 PROCEDURE — 3008F PR BODY MASS INDEX (BMI) DOCUMENTED: ICD-10-PCS | Mod: CPTII,,, | Performed by: UROLOGY

## 2022-11-03 PROCEDURE — 4010F PR ACE/ARB THEARPY RXD/TAKEN: ICD-10-PCS | Mod: CPTII,,, | Performed by: UROLOGY

## 2022-11-03 PROCEDURE — 3079F DIAST BP 80-89 MM HG: CPT | Mod: CPTII,,, | Performed by: UROLOGY

## 2022-11-03 PROCEDURE — 99213 OFFICE O/P EST LOW 20 MIN: CPT | Mod: PBBFAC | Performed by: UROLOGY

## 2022-11-03 PROCEDURE — 99214 PR OFFICE/OUTPT VISIT, EST, LEVL IV, 30-39 MIN: ICD-10-PCS | Mod: S$PBB,,, | Performed by: UROLOGY

## 2022-11-03 PROCEDURE — 99214 OFFICE O/P EST MOD 30 MIN: CPT | Mod: S$PBB,,, | Performed by: UROLOGY

## 2022-11-03 PROCEDURE — 3079F PR MOST RECENT DIASTOLIC BLOOD PRESSURE 80-89 MM HG: ICD-10-PCS | Mod: CPTII,,, | Performed by: UROLOGY

## 2022-11-03 RX ORDER — OXYBUTYNIN CHLORIDE 5 MG/1
5 TABLET, EXTENDED RELEASE ORAL DAILY
Qty: 30 TABLET | Refills: 11 | Status: SHIPPED | OUTPATIENT
Start: 2022-11-03 | End: 2022-12-05

## 2022-11-03 NOTE — PROGRESS NOTES
"Subjective:       Patient ID: Teo Santiago is a 61 y.o. male.    Chief Complaint: Follow-up    HPI  62yo M presents for a 6 month follow up with PSA for BPH.    Patient last seen 04/2022 for evaluation of BPH. Patient had been on Afluzosin and finasteride with adequate response. Today he reports that he is having a lot of urinary symptoms lately. He says that he has trouble initiating stream in the morning and also feels like he is not completely emptying his bladder. He says that if he drinks a beer or drinks a lot of water he begins to have urgency and has had a few episodes of incontinence. Occasional burning or pain with urination. Denies any significant nocturia. Last PSA 2.4 (06/2021); PSA in 09/2022 1.18.    Review of Systems    Constitutional: no fever, no chills  Resp: No SOB, no cough, no difficulty breathing  CVS: No Chest pain, No palpitations, no edema  GI: No nausea, no vomiting, no diarrhea, no abdominal pain  : No dysuria, no hematuria    Objective:   Blood pressure 138/89, pulse 78, temperature 98.1 °F (36.7 °C), temperature source Oral, resp. rate 18, height 5' 6" (1.676 m), weight 62.6 kg (138 lb), SpO2 100 %.    Physical Exam    General: Well developed, no acute distress  CV: No edema, 2+ peripheral pulses  Resp: Non-labored breathing, symmetrical chest expansion bilaterally  Abd: soft, non-distended, non-tender to palpation  : prostate, soft, smooth, no nodules, non tender  Skin: warm, dry, pink    POCT Bladder Scan  Order: [id number]  Status: Final result     Visible to patient: No (inaccessible in Patient Portal)     Next appt: 12/01/2022 at 09:25 AM in Urology (Yasmin Live DO)     Dx: Benign prostatic hyperplasia, unspeci...     0 Result Notes  Component Ref Range & Units 09:24    POC Residual Urine Volume 0 - 100 mL 129 Abnormal                Specimen Collected: 11/03/22 09:24 Last Resulted: 11/03/22 09:24             Assessment:       1. Benign prostatic hyperplasia, unspecified " whether lower urinary tract symptoms present    2. Urge incontinence        Plan:       - Will initiate Oxybutynin 5mg for the urge incontinence  - Continue with Afluzosin 10mg and Finasteride 5mg qD  - Discussed importance of avoiding bladder irritants and provided education on bladder irritants  - Bladder scan in office showed posit void residual volume of 129, slightly higher than acceptable amount  - PSA 09/2022 has decreased from previous level, will continue to monitor    RC in one month for bladder scan since patient has been initiated on Oxybutynin    Heike Silva MD  [employer] [facility]  PGY-3

## 2022-11-07 ENCOUNTER — TELEPHONE (OUTPATIENT)
Dept: FAMILY MEDICINE | Facility: CLINIC | Age: 62
End: 2022-11-07
Payer: MEDICARE

## 2022-11-07 NOTE — TELEPHONE ENCOUNTER
----- Message from ANDREW Hughes sent at 11/7/2022  3:23 PM CST -----  Please inform pt   Benign appearance and behavior he continue low-dose CT. Repeat in 1 year.

## 2022-12-05 ENCOUNTER — OFFICE VISIT (OUTPATIENT)
Dept: UROLOGY | Facility: CLINIC | Age: 62
End: 2022-12-05
Payer: MEDICARE

## 2022-12-05 VITALS
BODY MASS INDEX: 21.29 KG/M2 | RESPIRATION RATE: 18 BRPM | OXYGEN SATURATION: 100 % | DIASTOLIC BLOOD PRESSURE: 77 MMHG | HEIGHT: 67 IN | HEART RATE: 78 BPM | WEIGHT: 135.63 LBS | SYSTOLIC BLOOD PRESSURE: 128 MMHG | TEMPERATURE: 98 F

## 2022-12-05 DIAGNOSIS — N40.1 BPH WITH OBSTRUCTION/LOWER URINARY TRACT SYMPTOMS: Primary | ICD-10-CM

## 2022-12-05 DIAGNOSIS — N13.8 BPH WITH OBSTRUCTION/LOWER URINARY TRACT SYMPTOMS: Primary | ICD-10-CM

## 2022-12-05 DIAGNOSIS — N39.41 URGE INCONTINENCE: ICD-10-CM

## 2022-12-05 DIAGNOSIS — R33.9 INCOMPLETE BLADDER EMPTYING: ICD-10-CM

## 2022-12-05 LAB — POC RESIDUAL URINE VOLUME: 14 ML (ref 0–100)

## 2022-12-05 PROCEDURE — 3078F PR MOST RECENT DIASTOLIC BLOOD PRESSURE < 80 MM HG: ICD-10-PCS | Mod: CPTII,,, | Performed by: UROLOGY

## 2022-12-05 PROCEDURE — 4010F PR ACE/ARB THEARPY RXD/TAKEN: ICD-10-PCS | Mod: CPTII,,, | Performed by: UROLOGY

## 2022-12-05 PROCEDURE — 99214 PR OFFICE/OUTPT VISIT, EST, LEVL IV, 30-39 MIN: ICD-10-PCS | Mod: S$PBB,,, | Performed by: UROLOGY

## 2022-12-05 PROCEDURE — 3074F PR MOST RECENT SYSTOLIC BLOOD PRESSURE < 130 MM HG: ICD-10-PCS | Mod: CPTII,,, | Performed by: UROLOGY

## 2022-12-05 PROCEDURE — 4010F ACE/ARB THERAPY RXD/TAKEN: CPT | Mod: CPTII,,, | Performed by: UROLOGY

## 2022-12-05 PROCEDURE — 3074F SYST BP LT 130 MM HG: CPT | Mod: CPTII,,, | Performed by: UROLOGY

## 2022-12-05 PROCEDURE — 3078F DIAST BP <80 MM HG: CPT | Mod: CPTII,,, | Performed by: UROLOGY

## 2022-12-05 PROCEDURE — 99214 OFFICE O/P EST MOD 30 MIN: CPT | Mod: S$PBB,,, | Performed by: UROLOGY

## 2022-12-05 PROCEDURE — 3008F PR BODY MASS INDEX (BMI) DOCUMENTED: ICD-10-PCS | Mod: CPTII,,, | Performed by: UROLOGY

## 2022-12-05 PROCEDURE — 3008F BODY MASS INDEX DOCD: CPT | Mod: CPTII,,, | Performed by: UROLOGY

## 2022-12-05 PROCEDURE — 1159F MED LIST DOCD IN RCRD: CPT | Mod: CPTII,,, | Performed by: UROLOGY

## 2022-12-05 PROCEDURE — 1160F RVW MEDS BY RX/DR IN RCRD: CPT | Mod: CPTII,,, | Performed by: UROLOGY

## 2022-12-05 PROCEDURE — 1159F PR MEDICATION LIST DOCUMENTED IN MEDICAL RECORD: ICD-10-PCS | Mod: CPTII,,, | Performed by: UROLOGY

## 2022-12-05 PROCEDURE — 1160F PR REVIEW ALL MEDS BY PRESCRIBER/CLIN PHARMACIST DOCUMENTED: ICD-10-PCS | Mod: CPTII,,, | Performed by: UROLOGY

## 2022-12-05 PROCEDURE — 99215 OFFICE O/P EST HI 40 MIN: CPT | Mod: PBBFAC | Performed by: UROLOGY

## 2022-12-05 PROCEDURE — 51798 US URINE CAPACITY MEASURE: CPT | Mod: PBBFAC | Performed by: UROLOGY

## 2022-12-05 RX ORDER — OXYBUTYNIN CHLORIDE 10 MG/1
10 TABLET, EXTENDED RELEASE ORAL DAILY
Qty: 90 TABLET | Refills: 3 | Status: SHIPPED | OUTPATIENT
Start: 2022-12-05 | End: 2023-09-25

## 2022-12-05 NOTE — PROGRESS NOTES
CC:  Follow-up    HPI:  Teo Santiago is a 61 y.o. male seen for follow-up of oxybutynin trial.  He was given oxybutynin for complaints of urinary hesitancy in the morning, incomplete emptying, and occasional urge incontinence.  Bladder scan residual at his last visit was 129 cc.  He continues to take alfuzosin and finasteride.  He states that oxybutynin helped for about two weeks but did not completely resolve the symptoms.  He had a cystoscopy in the past which did not show a stricture but did confirm enlargement of the prostate.    Bladder scan residual:  14 ml    Lab Results:  PSA - September of 2022:  1.18    Data Review:  Cystoscopy.    ROS:  All systems reviewed and are negative except as documented in HPI and/or Assessment and Plan.     Patient Active Problem List:     Patient Active Problem List   Diagnosis    Radiculopathy of leg    Spinal stenosis of lumbar region without neurogenic claudication    Personal history of tobacco use, presenting hazards to health    BPH (benign prostatic hyperplasia)        Past Medical History:  Past Medical History:   Diagnosis Date    BPH (benign prostatic hyperplasia)     GERD (gastroesophageal reflux disease)     Hypertension     Rheumatoid arthritis, unspecified         Past Surgical History:  Past Surgical History:   Procedure Laterality Date    SPINE SURGERY  06/09/2020    METRX DLL LEFT L4/5, 06/09/20, [facility], DR. PONCE.    TONSILLECTOMY          Family History:  History reviewed. No pertinent family history.     Social History:  Social History     Socioeconomic History    Marital status:    Tobacco Use    Smoking status: Every Day     Packs/day: 1.00     Years: 38.00     Pack years: 38.00     Types: Cigarettes    Smokeless tobacco: Never   Substance and Sexual Activity    Alcohol use: Yes     Alcohol/week: 6.0 standard drinks     Types: 6 Cans of beer per week     Comment: socially    Drug use: Not Currently     Types: Cocaine, Methamphetamines    Sexual  activity: Yes        Allergies:  Review of patient's allergies indicates:  No Known Allergies     Objective:  Vitals:    12/05/22 1011   BP: 128/77   Pulse: 78   Resp: 18   Temp: 97.8 °F (36.6 °C)     General:  Well developed, well nourished adult male in no acute distress  Abdomen: Soft, nontender, no masses  Extremities:  No clubbing, cyanosis, or edema  Neurologic:  Grossly intact  Musculoskeletal:  Normal tone    Assessment:  1. BPH with obstruction/lower urinary tract symptoms  - Ambulatory referral/consult to Urology; Future  - POCT Bladder Scan    2. Urge incontinence  - oxybutynin (DITROPAN-XL) 10 MG 24 hr tablet; Take 1 tablet (10 mg total) by mouth once daily.  Dispense: 90 tablet; Refill: 3  - Ambulatory referral/consult to Urology; Future  - POCT Bladder Scan    3. Incomplete bladder emptying  - Ambulatory referral/consult to Urology; Future  - POCT Bladder Scan     Plan:  Continue alfuzosin and finasteride.  He would like to investigate a TURP so I will refer him to [place] for that.  Oxybutynin was increased to 10 mg and a prescription given.  He emptied his bladder well today.    Follow-up:    Six months with a bladder scan.

## 2022-12-05 NOTE — PROGRESS NOTES
Pt seen by Dr. Lugo; Post void bladder scan performed w/14 mls recorded; Referral to [facility] for TURP placed; Pt instructed to return to clinic in 6 months after JESSICA referral; Discharge paperwork given w/pt verbalizing understanding

## 2023-01-19 RX ORDER — AMOXICILLIN AND CLAVULANATE POTASSIUM 875; 125 MG/1; MG/1
1 TABLET, FILM COATED ORAL EVERY 12 HOURS
Qty: 14 TABLET | Refills: 0 | Status: SHIPPED | OUTPATIENT
Start: 2023-01-19 | End: 2023-07-21

## 2023-01-24 ENCOUNTER — OFFICE VISIT (OUTPATIENT)
Dept: FAMILY MEDICINE | Facility: CLINIC | Age: 63
End: 2023-01-24
Payer: MEDICARE

## 2023-01-24 VITALS
BODY MASS INDEX: 22.99 KG/M2 | SYSTOLIC BLOOD PRESSURE: 124 MMHG | DIASTOLIC BLOOD PRESSURE: 86 MMHG | WEIGHT: 146.5 LBS | OXYGEN SATURATION: 99 % | RESPIRATION RATE: 16 BRPM | HEIGHT: 67 IN | HEART RATE: 89 BPM

## 2023-01-24 DIAGNOSIS — H72.91 PERFORATED EAR DRUM, RIGHT: Primary | ICD-10-CM

## 2023-01-24 PROCEDURE — 3074F SYST BP LT 130 MM HG: CPT | Mod: ,,, | Performed by: NURSE PRACTITIONER

## 2023-01-24 PROCEDURE — 3074F PR MOST RECENT SYSTOLIC BLOOD PRESSURE < 130 MM HG: ICD-10-PCS | Mod: ,,, | Performed by: NURSE PRACTITIONER

## 2023-01-24 PROCEDURE — 99213 OFFICE O/P EST LOW 20 MIN: CPT | Mod: ,,, | Performed by: NURSE PRACTITIONER

## 2023-01-24 PROCEDURE — 3008F BODY MASS INDEX DOCD: CPT | Mod: ,,, | Performed by: NURSE PRACTITIONER

## 2023-01-24 PROCEDURE — 4010F ACE/ARB THERAPY RXD/TAKEN: CPT | Mod: ,,, | Performed by: NURSE PRACTITIONER

## 2023-01-24 PROCEDURE — 4010F PR ACE/ARB THEARPY RXD/TAKEN: ICD-10-PCS | Mod: ,,, | Performed by: NURSE PRACTITIONER

## 2023-01-24 PROCEDURE — 99213 PR OFFICE/OUTPT VISIT, EST, LEVL III, 20-29 MIN: ICD-10-PCS | Mod: ,,, | Performed by: NURSE PRACTITIONER

## 2023-01-24 PROCEDURE — 3079F DIAST BP 80-89 MM HG: CPT | Mod: ,,, | Performed by: NURSE PRACTITIONER

## 2023-01-24 PROCEDURE — 3079F PR MOST RECENT DIASTOLIC BLOOD PRESSURE 80-89 MM HG: ICD-10-PCS | Mod: ,,, | Performed by: NURSE PRACTITIONER

## 2023-01-24 PROCEDURE — 1159F PR MEDICATION LIST DOCUMENTED IN MEDICAL RECORD: ICD-10-PCS | Mod: ,,, | Performed by: NURSE PRACTITIONER

## 2023-01-24 PROCEDURE — 1159F MED LIST DOCD IN RCRD: CPT | Mod: ,,, | Performed by: NURSE PRACTITIONER

## 2023-01-24 PROCEDURE — 3008F PR BODY MASS INDEX (BMI) DOCUMENTED: ICD-10-PCS | Mod: ,,, | Performed by: NURSE PRACTITIONER

## 2023-01-24 NOTE — PROGRESS NOTES
"  SUBJECTIVE:     History of Present Illness      Chief Complaint: Follow-up (C/O right ear pain x 10 days.  Went to urgent care ([place]) given "shots" on 01/22/23.  On antibiotics for 7 days.  Some drainage to ear.  Yellow drainage.  Told have a hole in his ear.  States has had for 50 years.)    HPI:  Patient is a 62 y.o. year old male who presents to clinic for follow-up of right ear pain.  Patient was started on antibiotics a few days ago.  States that he did go to urgent care yesterday and was given 2 shots.  He states the pain is much better since starting antibiotic the drainage has improved.  States that they told him he had a hole his ear.    Review of Systems:  General: Denies fever, chills, fatigue, myalgias, and change in appetite   Eyes: Denies change in vision, eye redness, eye drainage, eye pain  ENT: + ear pain or pressure,  Resp: Denies wheezing, and shortness of breath   Cardio: Denies chest pain, palpitations, pleuritic pain, and edema   GI: Denies nausea, vomiting, diarrhea, and abdominal pain   : Denies dysuria, hematuria, and discharge   MSK: Denies trauma, joint pain, and trouble ambulating   Neuro: Denies LOC, dizziness, seizure like activity, and focal deficits   Skin: Denies rashes, open lesions and ulcers      Previous History      Review of patient's allergies indicates:  No Known Allergies    Past Medical History:   Diagnosis Date    BPH (benign prostatic hyperplasia)     GERD (gastroesophageal reflux disease)     Hypertension     Rheumatoid arthritis, unspecified      Current Outpatient Medications   Medication Instructions    alfuzosin (UROXATRAL) 10 mg, With breakfast    amoxicillin-clavulanate 875-125mg (AUGMENTIN) 875-125 mg per tablet 1 tablet, Oral, Every 12 hours    DULoxetine (CYMBALTA) 20 mg, Oral, 2 times daily    finasteride (PROSCAR) 5 mg, Oral, Daily    gabapentin (NEURONTIN) 300 mg, Oral, 3 times daily    ibuprofen (ADVIL,MOTRIN) 800 MG tablet TAKE ONE TABLET BY " "MOUTH TWICE A DAY AS NEEDED    lisinopriL 10 mg, Oral, Daily    loratadine (CLARITIN) 10 mg tablet TAKE ONE TABLET BY MOUTH DAILY FOR ALLERGY SYMPTOMS<BR>Strength: 10 mg    oxybutynin (DITROPAN-XL) 10 mg, Oral, Daily    pantoprazole (PROTONIX) 40 mg, Oral, Daily    traMADoL (ULTRAM) 50 mg tablet TAKE 1 TABLET BY MOUTH EVERY 4 HOURS AS NEEDED FOR PAIN FOR 5 DAYS     Past Surgical History:   Procedure Laterality Date    SPINE SURGERY  06/09/2020    METRX DLL LEFT L4/5, 06/09/20, [facility], DR. PONCE.    TONSILLECTOMY       History reviewed. No pertinent family history.    Social History     Tobacco Use    Smoking status: Every Day     Packs/day: 1.00     Years: 38.00     Pack years: 38.00     Types: Cigarettes    Smokeless tobacco: Never   Substance Use Topics    Alcohol use: Yes     Alcohol/week: 6.0 standard drinks     Types: 6 Cans of beer per week     Comment: socially    Drug use: Not Currently     Types: Cocaine, Methamphetamines        Health Maintenance      Health Maintenance   Topic Date Due    Hepatitis C Screening  Never done    LDCT Lung Screen  11/01/2023    Lipid Panel  09/19/2027    TETANUS VACCINE  07/28/2031       OBJECTIVE:     Physical Exam      Vital Signs Reviewed   /86   Pulse 89   Resp 16   Ht 5' 7" (1.702 m)   Wt 66.5 kg (146 lb 8 oz)   SpO2 99%   BMI 22.95 kg/m²     Physical Exam:  General: Alert, well nourished, no acute distress, non-toxic appearing.   Eyes: Anicteric sclera, without conjunctival injection, normal lids, no purulent drainage, EOMs grossly intact.   Ears: + right tragal tenderness. Tympanic membrane perforated   Mouth: Posterior pharynx without erythema. No exudate, ulcerations, or lesion. No tonsillar swelling.   Neck: Supple, full ROM, no rigidity, no cervical adenopathy.   Cardio: Normal rate and rhythm    Resp: Respirations even and unlabored, clear to auscultation bilaterally.   Abd: No ecchymosis or distension. Normal bowel sounds in all 4 quadrants. No tenderness " to palpation. No rebound tenderness or guarding. No CVA tenderness.   Skin: No rashes or open lesions noted.   MSK: No swelling. No abrasions or signs of trauma. Ambulating without assistance.   Neuro: Alert,oriented No focal deficits noted. Facial expressions even.   Psych: Cooperative, Normal affect      Procedures    Procedures     Labs     Results for orders placed or performed in visit on 12/05/22   POCT Bladder Scan   Result Value Ref Range    POC Residual Urine Volume 14 0 - 100 mL        Assessment       1. Perforated ear drum, right           Plan     .1. Perforated ear drum, right  Continue oral antibiotics.  Follow-up in 2-3 weeks.  ENT referral sent instructions provided to patient to avoid using Q-tips or anything in the ear.  Use  cotton ball to  avoid getting water in ear .          Medication List with Changes/Refills   Current Medications    ALFUZOSIN (UROXATRAL) 10 MG TB24    10 mg daily with breakfast.    AMOXICILLIN-CLAVULANATE 875-125MG (AUGMENTIN) 875-125 MG PER TABLET    Take 1 tablet by mouth every 12 (twelve) hours.    DULOXETINE (CYMBALTA) 20 MG CAPSULE    Take 1 capsule (20 mg total) by mouth 2 (two) times daily.    FINASTERIDE (PROSCAR) 5 MG TABLET    Take 5 mg by mouth once daily.    GABAPENTIN (NEURONTIN) 300 MG CAPSULE    Take 1 capsule (300 mg total) by mouth 3 (three) times daily.    IBUPROFEN (ADVIL,MOTRIN) 800 MG TABLET    TAKE ONE TABLET BY MOUTH TWICE A DAY AS NEEDED    LISINOPRIL 10 MG TABLET    Take 1 tablet (10 mg total) by mouth once daily.    LORATADINE (CLARITIN) 10 MG TABLET    TAKE ONE TABLET BY MOUTH DAILY FOR ALLERGY SYMPTOMS  Strength: 10 mg    OXYBUTYNIN (DITROPAN-XL) 10 MG 24 HR TABLET    Take 1 tablet (10 mg total) by mouth once daily.    PANTOPRAZOLE (PROTONIX) 40 MG TABLET    Take 1 tablet (40 mg total) by mouth once daily.    TRAMADOL (ULTRAM) 50 MG TABLET    TAKE 1 TABLET BY MOUTH EVERY 4 HOURS AS NEEDED FOR PAIN FOR 5 DAYS           Future Appointments   Date  Time Provider Department Center   2/9/2023  8:00 AM ANDREW [facility]   7/3/2023  9:45 [facility]   9/25/2023  9:40 AM ANDREW [facility]

## 2023-02-09 ENCOUNTER — OFFICE VISIT (OUTPATIENT)
Dept: FAMILY MEDICINE | Facility: CLINIC | Age: 63
End: 2023-02-09
Payer: MEDICARE

## 2023-02-09 VITALS
WEIGHT: 148.19 LBS | DIASTOLIC BLOOD PRESSURE: 82 MMHG | SYSTOLIC BLOOD PRESSURE: 133 MMHG | RESPIRATION RATE: 18 BRPM | TEMPERATURE: 98 F | HEART RATE: 93 BPM | BODY MASS INDEX: 23.21 KG/M2 | OXYGEN SATURATION: 99 %

## 2023-02-09 DIAGNOSIS — H72.91 PERFORATED EAR DRUM, RIGHT: Primary | ICD-10-CM

## 2023-02-09 DIAGNOSIS — Z72.0 TOBACCO USER: ICD-10-CM

## 2023-02-09 PROCEDURE — 99213 PR OFFICE/OUTPT VISIT, EST, LEVL III, 20-29 MIN: ICD-10-PCS | Mod: ,,, | Performed by: NURSE PRACTITIONER

## 2023-02-09 PROCEDURE — 99213 OFFICE O/P EST LOW 20 MIN: CPT | Mod: ,,, | Performed by: NURSE PRACTITIONER

## 2023-02-09 PROCEDURE — 3008F BODY MASS INDEX DOCD: CPT | Mod: ,,, | Performed by: NURSE PRACTITIONER

## 2023-02-09 PROCEDURE — 4010F ACE/ARB THERAPY RXD/TAKEN: CPT | Mod: ,,, | Performed by: NURSE PRACTITIONER

## 2023-02-09 PROCEDURE — 4010F PR ACE/ARB THEARPY RXD/TAKEN: ICD-10-PCS | Mod: ,,, | Performed by: NURSE PRACTITIONER

## 2023-02-09 PROCEDURE — 3008F PR BODY MASS INDEX (BMI) DOCUMENTED: ICD-10-PCS | Mod: ,,, | Performed by: NURSE PRACTITIONER

## 2023-02-09 PROCEDURE — 3075F PR MOST RECENT SYSTOLIC BLOOD PRESS GE 130-139MM HG: ICD-10-PCS | Mod: ,,, | Performed by: NURSE PRACTITIONER

## 2023-02-09 PROCEDURE — 3079F PR MOST RECENT DIASTOLIC BLOOD PRESSURE 80-89 MM HG: ICD-10-PCS | Mod: ,,, | Performed by: NURSE PRACTITIONER

## 2023-02-09 PROCEDURE — 1159F MED LIST DOCD IN RCRD: CPT | Mod: ,,, | Performed by: NURSE PRACTITIONER

## 2023-02-09 PROCEDURE — 3079F DIAST BP 80-89 MM HG: CPT | Mod: ,,, | Performed by: NURSE PRACTITIONER

## 2023-02-09 PROCEDURE — 1159F PR MEDICATION LIST DOCUMENTED IN MEDICAL RECORD: ICD-10-PCS | Mod: ,,, | Performed by: NURSE PRACTITIONER

## 2023-02-09 PROCEDURE — 3075F SYST BP GE 130 - 139MM HG: CPT | Mod: ,,, | Performed by: NURSE PRACTITIONER

## 2023-02-09 RX ORDER — CIPROFLOXACIN AND DEXAMETHASONE 3; 1 MG/ML; MG/ML
1 SUSPENSION/ DROPS AURICULAR (OTIC) 4 TIMES DAILY
COMMUNITY
Start: 2023-02-08 | End: 2023-07-21

## 2023-02-09 NOTE — PROGRESS NOTES
"SUBJECTIVE:     History of Present Illness      Chief Complaint: Follow-up (States here for "check -up"; hx rt ear infection and saw referral ENT who put him on antibiotic drops)    HPI:  Patient is a 62 y.o. year old male who presents to clinic for follow up  right ear pain and drainage. He started seeing ENT on Wednesday , taking ear drops and oral antibiotics. Pt states drainage is better.   No pain. He has a buzzing sound in his ear. With some improvement     Review of Systems:    Review of Systems    12 point review of systems conducted, negative except as stated in the history of present illness. See HPI for details.     Previous History      Review of patient's allergies indicates:  No Known Allergies    Past Medical History:   Diagnosis Date    BPH (benign prostatic hyperplasia)     GERD (gastroesophageal reflux disease)     Hypertension     Rheumatoid arthritis, unspecified      Current Outpatient Medications   Medication Instructions    alfuzosin (UROXATRAL) 10 mg, With breakfast    amoxicillin-clavulanate 875-125mg (AUGMENTIN) 875-125 mg per tablet 1 tablet, Oral, Every 12 hours    ciprofloxacin-dexAMETHasone 0.3-0.1% (CIPRODEX) 0.3-0.1 % DrpS 1 drop, Right Ear, 4 times daily    DULoxetine (CYMBALTA) 20 mg, Oral, 2 times daily    finasteride (PROSCAR) 5 mg, Oral, Daily    gabapentin (NEURONTIN) 300 mg, Oral, 3 times daily    ibuprofen (ADVIL,MOTRIN) 800 MG tablet TAKE ONE TABLET BY MOUTH TWICE A DAY AS NEEDED    lisinopriL 10 mg, Oral, Daily    loratadine (CLARITIN) 10 mg tablet TAKE ONE TABLET BY MOUTH DAILY FOR ALLERGY SYMPTOMS<BR>Strength: 10 mg    oxybutynin (DITROPAN-XL) 10 mg, Oral, Daily    pantoprazole (PROTONIX) 40 mg, Oral, Daily     Past Surgical History:   Procedure Laterality Date    SPINE SURGERY  06/09/2020    METRX DLL LEFT L4/5, 06/09/20, DR. ROSARIO KELLEY.    TONSILLECTOMY       No family history on file.    Social History     Tobacco Use    Smoking status: Every Day     Packs/day: 1.00     " Patient seen results online, [employer] (Inspect Manager) message sent to patient.   Years: 38.00     Pack years: 38.00     Types: Cigarettes    Smokeless tobacco: Never   Substance Use Topics    Alcohol use: Yes     Alcohol/week: 6.0 standard drinks     Types: 6 Cans of beer per week     Comment: socially    Drug use: Not Currently     Types: Cocaine, Methamphetamines        Health Maintenance      Health Maintenance   Topic Date Due    Hepatitis C Screening  Never done    LDCT Lung Screen  11/01/2023    Lipid Panel  09/19/2027    TETANUS VACCINE  07/28/2031       OBJECTIVE:     Physical Exam      Vital Signs Reviewed   Visit Vitals  /82   Pulse 93   Temp 98.1 °F (36.7 °C)   Resp 18   Wt 67.2 kg (148 lb 3.2 oz)   SpO2 99%   BMI 23.21 kg/m²       Physical Exam    Physical Exam:  General: Alert, well nourished, no acute distress, non-toxic appearing.   Eyes: Anicteric sclera, without conjunctival injection, normal lids, no purulent drainage, EOMs grossly intact.   Ears:   Right Tympanic membranes  not intact, Mouth: Posterior pharynx without erythema. No exudate, ulcerations, or lesion. No tonsillar swelling.   Neck: Supple, full ROM, no rigidity, no cervical adenopathy.   Cardio: Normal rate and rhythm    Resp: Respirations even and unlabored, clear to auscultation bilaterally.   Abd: No ecchymosis or distension. Normal bowel sounds in all 4 quadrants. No tenderness to palpation. No rebound tenderness or guarding. No CVA tenderness.   Skin: No rashes or open lesions noted.   MSK: No swelling. No abrasions or signs of trauma. Ambulating without assistance.   Neuro: Alert,oriented No focal deficits noted. Facial expressions even.   Psych: Cooperative, Normal affect        Assessment       1. Perforated ear drum, right    2. Tobacco user           Plan       1. Perforated ear drum, right  Currently seeing ENT patient unsure doctor's name.  Was started on drops has a appointment in 4 weeks with ENT.  2. Tobacco user  Encouraged smoking cessation      Medication List with Changes/Refills   Current  Medications    ALFUZOSIN (UROXATRAL) 10 MG TB24    10 mg daily with breakfast.    AMOXICILLIN-CLAVULANATE 875-125MG (AUGMENTIN) 875-125 MG PER TABLET    Take 1 tablet by mouth every 12 (twelve) hours.    CIPROFLOXACIN-DEXAMETHASONE 0.3-0.1% (CIPRODEX) 0.3-0.1 % DRPS    Place 1 drop into the right ear 4 (four) times daily.    DULOXETINE (CYMBALTA) 20 MG CAPSULE    Take 1 capsule (20 mg total) by mouth 2 (two) times daily.    FINASTERIDE (PROSCAR) 5 MG TABLET    Take 5 mg by mouth once daily.    GABAPENTIN (NEURONTIN) 300 MG CAPSULE    Take 1 capsule (300 mg total) by mouth 3 (three) times daily.    IBUPROFEN (ADVIL,MOTRIN) 800 MG TABLET    TAKE ONE TABLET BY MOUTH TWICE A DAY AS NEEDED    LISINOPRIL 10 MG TABLET    Take 1 tablet (10 mg total) by mouth once daily.    LORATADINE (CLARITIN) 10 MG TABLET    TAKE ONE TABLET BY MOUTH DAILY FOR ALLERGY SYMPTOMS  Strength: 10 mg    OXYBUTYNIN (DITROPAN-XL) 10 MG 24 HR TABLET    Take 1 tablet (10 mg total) by mouth once daily.    PANTOPRAZOLE (PROTONIX) 40 MG TABLET    Take 1 tablet (40 mg total) by mouth once daily.   Discontinued Medications    TRAMADOL (ULTRAM) 50 MG TABLET    TAKE 1 TABLET BY MOUTH EVERY 4 HOURS AS NEEDED FOR PAIN FOR 5 DAYS       No follow-ups on file.     Future Appointments   Date Time Provider Department Center   7/3/2023  9:45 AM Yasmin Lugo DO [facility]    9/25/2023  9:40 AM ANDREW [facility]

## 2023-03-20 ENCOUNTER — TELEPHONE (OUTPATIENT)
Dept: UROLOGY | Facility: CLINIC | Age: 63
End: 2023-03-20
Payer: MEDICARE

## 2023-03-20 NOTE — TELEPHONE ENCOUNTER
Called pt.  3/20/23 7680 he stated Medicare does not cover transportation to [facility]  he resides in [place], [place]

## 2023-03-27 DIAGNOSIS — G89.29 CHRONIC LEFT-SIDED LOW BACK PAIN WITH LEFT-SIDED SCIATICA: ICD-10-CM

## 2023-03-27 DIAGNOSIS — I10 HYPERTENSION, UNSPECIFIED TYPE: Primary | ICD-10-CM

## 2023-03-27 DIAGNOSIS — M54.42 CHRONIC LEFT-SIDED LOW BACK PAIN WITH LEFT-SIDED SCIATICA: ICD-10-CM

## 2023-03-27 RX ORDER — IBUPROFEN 800 MG/1
800 TABLET ORAL 2 TIMES DAILY PRN
Qty: 60 TABLET | Refills: 1 | Status: SHIPPED | OUTPATIENT
Start: 2023-03-27 | End: 2023-09-25 | Stop reason: SDUPTHER

## 2023-03-27 RX ORDER — IBUPROFEN 800 MG/1
TABLET ORAL
Qty: 60 TABLET | Refills: 1 | Status: CANCELLED | OUTPATIENT
Start: 2023-03-27

## 2023-03-27 RX ORDER — LISINOPRIL 10 MG/1
10 TABLET ORAL DAILY
Qty: 30 TABLET | Refills: 3 | Status: SHIPPED | OUTPATIENT
Start: 2023-03-27 | End: 2023-07-24

## 2023-03-27 RX ORDER — LISINOPRIL 10 MG/1
10 TABLET ORAL DAILY
Qty: 30 TABLET | Refills: 0 | Status: CANCELLED | OUTPATIENT
Start: 2023-03-27

## 2023-07-19 ENCOUNTER — TELEPHONE (OUTPATIENT)
Dept: UROLOGY | Facility: CLINIC | Age: 63
End: 2023-07-19
Payer: MEDICARE

## 2023-07-19 DIAGNOSIS — M54.42 CHRONIC LEFT-SIDED LOW BACK PAIN WITH LEFT-SIDED SCIATICA: Primary | ICD-10-CM

## 2023-07-19 DIAGNOSIS — G89.29 CHRONIC LEFT-SIDED LOW BACK PAIN WITH LEFT-SIDED SCIATICA: Primary | ICD-10-CM

## 2023-07-19 RX ORDER — PREDNISONE 10 MG/1
10 TABLET ORAL DAILY
Qty: 7 TABLET | Refills: 0 | Status: SHIPPED | OUTPATIENT
Start: 2023-07-19 | End: 2023-07-26

## 2023-07-19 NOTE — TELEPHONE ENCOUNTER
Referral faxed to [facility]    ----- Message from Alyson Juarez sent at 6/28/2023 10:55 AM CDT -----  [facility]  Patient called stating he cannot make it to [facility] due to disability and transportation. He would like to know if he can be referred to [facility] and/or [place]. Please address, thanks!

## 2023-07-21 ENCOUNTER — OFFICE VISIT (OUTPATIENT)
Dept: FAMILY MEDICINE | Facility: CLINIC | Age: 63
End: 2023-07-21
Payer: MEDICARE

## 2023-07-21 VITALS
OXYGEN SATURATION: 99 % | HEART RATE: 110 BPM | DIASTOLIC BLOOD PRESSURE: 77 MMHG | RESPIRATION RATE: 20 BRPM | BODY MASS INDEX: 21.09 KG/M2 | HEIGHT: 67 IN | WEIGHT: 134.38 LBS | SYSTOLIC BLOOD PRESSURE: 123 MMHG

## 2023-07-21 DIAGNOSIS — G89.29 CHRONIC LEFT-SIDED LOW BACK PAIN WITH LEFT-SIDED SCIATICA: Primary | ICD-10-CM

## 2023-07-21 DIAGNOSIS — M54.42 CHRONIC LEFT-SIDED LOW BACK PAIN WITH LEFT-SIDED SCIATICA: Primary | ICD-10-CM

## 2023-07-21 PROCEDURE — 3078F PR MOST RECENT DIASTOLIC BLOOD PRESSURE < 80 MM HG: ICD-10-PCS | Mod: ,,, | Performed by: NURSE PRACTITIONER

## 2023-07-21 PROCEDURE — 99214 PR OFFICE/OUTPT VISIT, EST, LEVL IV, 30-39 MIN: ICD-10-PCS | Mod: 25,,, | Performed by: NURSE PRACTITIONER

## 2023-07-21 PROCEDURE — 99214 OFFICE O/P EST MOD 30 MIN: CPT | Mod: 25,,, | Performed by: NURSE PRACTITIONER

## 2023-07-21 PROCEDURE — 4010F ACE/ARB THERAPY RXD/TAKEN: CPT | Mod: ,,, | Performed by: NURSE PRACTITIONER

## 2023-07-21 PROCEDURE — 3074F PR MOST RECENT SYSTOLIC BLOOD PRESSURE < 130 MM HG: ICD-10-PCS | Mod: ,,, | Performed by: NURSE PRACTITIONER

## 2023-07-21 PROCEDURE — 96372 PR INJECTION,THERAP/PROPH/DIAG2ST, IM OR SUBCUT: ICD-10-PCS | Mod: ,,, | Performed by: NURSE PRACTITIONER

## 2023-07-21 PROCEDURE — 3008F PR BODY MASS INDEX (BMI) DOCUMENTED: ICD-10-PCS | Mod: ,,, | Performed by: NURSE PRACTITIONER

## 2023-07-21 PROCEDURE — 3074F SYST BP LT 130 MM HG: CPT | Mod: ,,, | Performed by: NURSE PRACTITIONER

## 2023-07-21 PROCEDURE — 4010F PR ACE/ARB THEARPY RXD/TAKEN: ICD-10-PCS | Mod: ,,, | Performed by: NURSE PRACTITIONER

## 2023-07-21 PROCEDURE — 3008F BODY MASS INDEX DOCD: CPT | Mod: ,,, | Performed by: NURSE PRACTITIONER

## 2023-07-21 PROCEDURE — 3078F DIAST BP <80 MM HG: CPT | Mod: ,,, | Performed by: NURSE PRACTITIONER

## 2023-07-21 PROCEDURE — 96372 THER/PROPH/DIAG INJ SC/IM: CPT | Mod: ,,, | Performed by: NURSE PRACTITIONER

## 2023-07-21 RX ORDER — DEXAMETHASONE SODIUM PHOSPHATE 4 MG/ML
INJECTION, SOLUTION INTRA-ARTICULAR; INTRALESIONAL; INTRAMUSCULAR; INTRAVENOUS; SOFT TISSUE
Status: COMPLETED
Start: 2023-07-21 | End: 2023-07-21

## 2023-07-21 RX ADMIN — DEXAMETHASONE SODIUM PHOSPHATE 4 MG: 4 INJECTION, SOLUTION INTRA-ARTICULAR; INTRALESIONAL; INTRAMUSCULAR; INTRAVENOUS; SOFT TISSUE at 09:07

## 2023-07-21 NOTE — PATIENT INSTRUCTIONS
Administered Dexamethasone 8mg IM to left ventrogluteal. Pt tolerated well. No adverse reactions at present.

## 2023-07-21 NOTE — PROGRESS NOTES
SUBJECTIVE:     History of Present Illness      Chief Complaint: Hip Pain and Back Pain    HPI:  Patient is a 62 y.o. year old male who presents to clinic with complaints  of left hip pain that radiates down his leg.  Patient is requesting steroid injection states that it seems to help him a lot.  Patient states that he always has chronic lower back pain    Review of Systems:    Review of Systems    12 point review of systems conducted, negative except as stated in the history of present illness. See HPI for details.     Previous History      Review of patient's allergies indicates:  No Known Allergies    Past Medical History:   Diagnosis Date    BPH (benign prostatic hyperplasia)     GERD (gastroesophageal reflux disease)     Hypertension     Rheumatoid arthritis, unspecified      Current Outpatient Medications   Medication Instructions    alfuzosin (UROXATRAL) 10 mg, With breakfast    DULoxetine (CYMBALTA) 20 mg, Oral, 2 times daily    finasteride (PROSCAR) 5 mg, Oral, Daily    gabapentin (NEURONTIN) 300 mg, Oral, 3 times daily    ibuprofen (ADVIL,MOTRIN) 800 mg, Oral, 2 times daily PRN    lisinopriL 10 MG tablet TAKE ONE TABLET BY MOUTH once daily    loratadine (CLARITIN) 10 mg tablet TAKE ONE TABLET BY MOUTH DAILY FOR ALLERGY SYMPTOMS<BR>Strength: 10 mg    oxybutynin (DITROPAN-XL) 10 mg, Oral, Daily    pantoprazole (PROTONIX) 40 mg, Oral, Daily    predniSONE (DELTASONE) 10 mg, Oral, Daily     Past Surgical History:   Procedure Laterality Date    SPINE SURGERY  06/09/2020    METRX DLL LEFT L4/5, 06/09/20, DR. ROSARIO KELLEY.    TONSILLECTOMY       No family history on file.    Social History     Tobacco Use    Smoking status: Every Day     Packs/day: 1.00     Years: 38.00     Pack years: 38.00     Types: Cigarettes    Smokeless tobacco: Never   Substance Use Topics    Alcohol use: Yes     Alcohol/week: 6.0 standard drinks     Types: 6 Cans of beer per week     Comment: socially    Drug use: Not Currently     Types:  "Cocaine, Methamphetamines        Health Maintenance      Health Maintenance   Topic Date Due    Hepatitis C Screening  Never done    LDCT Lung Screen  11/01/2023    Lipid Panel  09/19/2027    TETANUS VACCINE  07/28/2031       OBJECTIVE:     Physical Exam      Vital Signs Reviewed   Visit Vitals  /77 (BP Location: Left arm)   Pulse 110   Resp 20   Ht 5' 7" (1.702 m)   Wt 61 kg (134 lb 6.4 oz)   SpO2 99%   BMI 21.05 kg/m²       Physical Exam    Physical Exam:  General: Alert, well nourished, no acute distress, non-toxic appearing.   Eyes: Anicteric sclera, without conjunctival injection, normal lids, no purulent drainage, EOMs grossly intact.   Ears: No tragal tenderness. Tympanic membranes intact, pearly grey, without effusion or erythema and with a positive light reflex.   Mouth: Posterior pharynx without erythema. No exudate, ulcerations, or lesion. No tonsillar swelling.   Neck: Supple, full ROM, no rigidity, no cervical adenopathy.   Cardio: Normal rate and rhythm    Resp: Respirations even and unlabored, clear to auscultation bilaterally.   Abd: No ecchymosis or distension. Normal bowel sounds in all 4 quadrants. No tenderness to palpation. No rebound tenderness or guarding. No CVA tenderness.   Skin: No rashes or open lesions noted.   MSK: No swelling. No abrasions or signs of trauma. Ambulating without assistance.   Neuro: Alert,oriented No focal deficits noted. Facial expressions even.   Psych: Cooperative, Normal affect      Procedures    Procedures     Labs     Results for orders placed or performed in visit on 12/05/22   POCT Bladder Scan   Result Value Ref Range    POC Residual Urine Volume 14 0 - 100 mL       Chemistry:  Lab Results   Component Value Date     09/19/2022    K 4.7 09/19/2022    CHLORIDE 104 09/19/2022    BUN 9.4 09/19/2022    CREATININE 0.83 09/19/2022    EGFRNORACEVR >60 09/19/2022    GLUCOSE 110 09/19/2022    CALCIUM 10.3 (H) 09/19/2022    ALKPHOS 103 09/19/2022    LABPROT " 8.0 (H) 09/19/2022    ALBUMIN 4.1 09/19/2022    AST 18 09/19/2022    ALT 16 09/19/2022    SZNYEIMU91DL 61.2 09/19/2022    TSH 1.1060 09/19/2022    PSA 1.18 09/19/2022        No results found for: HGBA1C, MICROALBCREA     Hematology:  Lab Results   Component Value Date    WBC 4.2 (L) 09/19/2022    HGB 15.7 09/19/2022    HCT 48.9 09/19/2022     09/19/2022       Lipid Panel:  Lab Results   Component Value Date    CHOL 149 09/19/2022    HDL 41 09/19/2022    LDL 91.00 09/19/2022    TRIG 87 09/19/2022    TOTALCHOLEST 4 09/19/2022        Urine:  No results found for: COLORUA, APPEARANCEUA, SGUA, PHUA, PROTEINUA, GLUCOSEUA, KETONESUA, BLOODUA, NITRITESUA, LEUKOCYTESUR, RBCUA, WBCUA, BACTERIA, SQEPUA, HYALINECASTS, CREATRANDUR, PROTEINURINE, UPROTCREA      Assessment            ICD-10-CM ICD-9-CM   1. Chronic left-sided low back pain with left-sided sciatica  M54.42 724.2    G89.29 724.3     338.29       Plan     1. Chronic left-sided low back pain with left-sided sciatica  - dexAMETHasone (DECADRON) 4 mg/mL injection      1. Chronic left-sided low back pain with left-sided sciatica  - dexAMETHasone (DECADRON) 4 mg/mL injection    Orders Placed This Encounter    dexAMETHasone (DECADRON) 4 mg/mL injection    dexAMETHasone (DECADRON) 4 mg/mL injection      Medication List with Changes/Refills   Current Medications    ALFUZOSIN (UROXATRAL) 10 MG TB24    10 mg daily with breakfast.    DULOXETINE (CYMBALTA) 20 MG CAPSULE    Take 1 capsule (20 mg total) by mouth 2 (two) times daily.    FINASTERIDE (PROSCAR) 5 MG TABLET    Take 5 mg by mouth once daily.    GABAPENTIN (NEURONTIN) 300 MG CAPSULE    Take 1 capsule (300 mg total) by mouth 3 (three) times daily.    IBUPROFEN (ADVIL,MOTRIN) 800 MG TABLET    Take 1 tablet (800 mg total) by mouth 2 (two) times daily as needed for Pain.    LORATADINE (CLARITIN) 10 MG TABLET    TAKE ONE TABLET BY MOUTH DAILY FOR ALLERGY SYMPTOMS  Strength: 10 mg    OXYBUTYNIN (DITROPAN-XL) 10 MG 24 HR  TABLET    Take 1 tablet (10 mg total) by mouth once daily.    PANTOPRAZOLE (PROTONIX) 40 MG TABLET    Take 1 tablet (40 mg total) by mouth once daily.    PREDNISONE (DELTASONE) 10 MG TABLET    Take 1 tablet (10 mg total) by mouth once daily. for 7 days   Changed and/or Refilled Medications    Modified Medication Previous Medication    LISINOPRIL 10 MG TABLET lisinopriL 10 MG tablet       TAKE ONE TABLET BY MOUTH once daily    Take 1 tablet (10 mg total) by mouth once daily.   Discontinued Medications    AMOXICILLIN-CLAVULANATE 875-125MG (AUGMENTIN) 875-125 MG PER TABLET    Take 1 tablet by mouth every 12 (twelve) hours.    CIPROFLOXACIN-DEXAMETHASONE 0.3-0.1% (CIPRODEX) 0.3-0.1 % DRPS    Place 1 drop into the right ear 4 (four) times daily.       Follow up for Wellness already scheduled, LABS Prior.   Follow up for Wellness already scheduled, LABS Prior. In addition to their scheduled follow up, the patient has also been instructed to follow up on as needed basis.   Future Appointments   Date Time Provider Department Center   9/21/2023 11:30 AM Yasmin Lugo DO [facility]   9/25/2023  9:00 AM ANDREW [facility]

## 2023-07-22 DIAGNOSIS — I10 HYPERTENSION, UNSPECIFIED TYPE: ICD-10-CM

## 2023-07-24 RX ORDER — LISINOPRIL 10 MG/1
TABLET ORAL
Qty: 30 TABLET | Refills: 3 | Status: SHIPPED | OUTPATIENT
Start: 2023-07-24 | End: 2023-09-25 | Stop reason: SDUPTHER

## 2023-08-16 DIAGNOSIS — J30.2 SEASONAL ALLERGIES: ICD-10-CM

## 2023-08-16 RX ORDER — LORATADINE 10 MG/1
TABLET ORAL
Qty: 90 TABLET | Refills: 2 | Status: SHIPPED | OUTPATIENT
Start: 2023-08-16 | End: 2023-08-16

## 2023-08-16 RX ORDER — LORATADINE 10 MG/1
10 TABLET ORAL DAILY
Qty: 90 TABLET | Refills: 2 | Status: SHIPPED | OUTPATIENT
Start: 2023-08-16 | End: 2023-09-25

## 2023-09-22 ENCOUNTER — LAB VISIT (OUTPATIENT)
Dept: LAB | Facility: HOSPITAL | Age: 63
End: 2023-09-22
Attending: NURSE PRACTITIONER
Payer: MEDICARE

## 2023-09-22 DIAGNOSIS — Z00.00 WELLNESS EXAMINATION: ICD-10-CM

## 2023-09-22 DIAGNOSIS — Z00.00 WELLNESS EXAMINATION: Primary | ICD-10-CM

## 2023-09-22 LAB
ALBUMIN SERPL-MCNC: 4.5 G/DL (ref 3.4–4.8)
ALBUMIN/GLOB SERPL: 1 RATIO (ref 1.1–2)
ALP SERPL-CCNC: 112 UNIT/L (ref 40–150)
ALT SERPL-CCNC: 18 UNIT/L (ref 0–55)
AST SERPL-CCNC: 21 UNIT/L (ref 5–34)
BASOPHILS # BLD AUTO: 0.05 X10(3)/MCL
BASOPHILS NFR BLD AUTO: 0.7 %
BILIRUB SERPL-MCNC: 0.6 MG/DL
BUN SERPL-MCNC: 15.3 MG/DL (ref 8.4–25.7)
CALCIUM SERPL-MCNC: 10.3 MG/DL (ref 8.8–10)
CHLORIDE SERPL-SCNC: 105 MMOL/L (ref 98–107)
CHOLEST SERPL-MCNC: 143 MG/DL
CHOLEST/HDLC SERPL: 5 {RATIO} (ref 0–5)
CO2 SERPL-SCNC: 26 MMOL/L (ref 23–31)
CREAT SERPL-MCNC: 1.08 MG/DL (ref 0.73–1.18)
DEPRECATED CALCIDIOL+CALCIFEROL SERPL-MC: 41.2 NG/ML (ref 30–80)
EOSINOPHIL # BLD AUTO: 0.78 X10(3)/MCL (ref 0–0.9)
EOSINOPHIL NFR BLD AUTO: 10.8 %
ERYTHROCYTE [DISTWIDTH] IN BLOOD BY AUTOMATED COUNT: 11.4 % (ref 11.5–17)
EST. AVERAGE GLUCOSE BLD GHB EST-MCNC: 96.8 MG/DL
GFR SERPLBLD CREATININE-BSD FMLA CKD-EPI: >60 MLS/MIN/1.73/M2
GLOBULIN SER-MCNC: 4.4 GM/DL (ref 2.4–3.5)
GLUCOSE SERPL-MCNC: 85 MG/DL (ref 82–115)
HBA1C MFR BLD: 5 %
HCT VFR BLD AUTO: 49 % (ref 42–52)
HDLC SERPL-MCNC: 30 MG/DL (ref 35–60)
HGB BLD-MCNC: 15.7 G/DL (ref 14–18)
IMM GRANULOCYTES # BLD AUTO: 0 X10(3)/MCL (ref 0–0.04)
IMM GRANULOCYTES NFR BLD AUTO: 0 %
LDLC SERPL CALC-MCNC: 87 MG/DL (ref 50–140)
LYMPHOCYTES # BLD AUTO: 1.99 X10(3)/MCL (ref 0.6–4.6)
LYMPHOCYTES NFR BLD AUTO: 27.5 %
MCH RBC QN AUTO: 32.4 PG (ref 27–31)
MCHC RBC AUTO-ENTMCNC: 32 G/DL (ref 33–36)
MCV RBC AUTO: 101 FL (ref 80–94)
MONOCYTES # BLD AUTO: 0.49 X10(3)/MCL (ref 0.1–1.3)
MONOCYTES NFR BLD AUTO: 6.8 %
NEUTROPHILS # BLD AUTO: 3.92 X10(3)/MCL (ref 2.1–9.2)
NEUTROPHILS NFR BLD AUTO: 54.2 %
PLATELET # BLD AUTO: 256 X10(3)/MCL (ref 130–400)
PMV BLD AUTO: 9.1 FL (ref 7.4–10.4)
POTASSIUM SERPL-SCNC: 4.7 MMOL/L (ref 3.5–5.1)
PROT SERPL-MCNC: 8.9 GM/DL (ref 5.8–7.6)
PSA SERPL-MCNC: 3.51 NG/ML
RBC # BLD AUTO: 4.85 X10(6)/MCL (ref 4.7–6.1)
SODIUM SERPL-SCNC: 139 MMOL/L (ref 136–145)
TRIGL SERPL-MCNC: 130 MG/DL (ref 34–140)
TSH SERPL-ACNC: 1.63 UIU/ML (ref 0.35–4.94)
VLDLC SERPL CALC-MCNC: 26 MG/DL
WBC # SPEC AUTO: 7.23 X10(3)/MCL (ref 4.5–11.5)

## 2023-09-22 PROCEDURE — 85025 COMPLETE CBC W/AUTO DIFF WBC: CPT

## 2023-09-22 PROCEDURE — 82306 VITAMIN D 25 HYDROXY: CPT

## 2023-09-22 PROCEDURE — 83036 HEMOGLOBIN GLYCOSYLATED A1C: CPT

## 2023-09-22 PROCEDURE — 84443 ASSAY THYROID STIM HORMONE: CPT

## 2023-09-22 PROCEDURE — 80053 COMPREHEN METABOLIC PANEL: CPT

## 2023-09-22 PROCEDURE — 80061 LIPID PANEL: CPT

## 2023-09-22 PROCEDURE — 36415 COLL VENOUS BLD VENIPUNCTURE: CPT

## 2023-09-22 PROCEDURE — 84153 ASSAY OF PSA TOTAL: CPT

## 2023-09-25 ENCOUNTER — OFFICE VISIT (OUTPATIENT)
Dept: FAMILY MEDICINE | Facility: CLINIC | Age: 63
End: 2023-09-25
Payer: MEDICARE

## 2023-09-25 VITALS
HEART RATE: 81 BPM | HEIGHT: 67 IN | OXYGEN SATURATION: 97 % | SYSTOLIC BLOOD PRESSURE: 125 MMHG | WEIGHT: 138.69 LBS | BODY MASS INDEX: 21.77 KG/M2 | DIASTOLIC BLOOD PRESSURE: 75 MMHG

## 2023-09-25 DIAGNOSIS — N40.1 BPH WITH URINARY OBSTRUCTION: ICD-10-CM

## 2023-09-25 DIAGNOSIS — M54.42 CHRONIC LEFT-SIDED LOW BACK PAIN WITH LEFT-SIDED SCIATICA: Primary | ICD-10-CM

## 2023-09-25 DIAGNOSIS — K21.9 GASTROESOPHAGEAL REFLUX DISEASE, UNSPECIFIED WHETHER ESOPHAGITIS PRESENT: ICD-10-CM

## 2023-09-25 DIAGNOSIS — N13.8 BPH WITH URINARY OBSTRUCTION: ICD-10-CM

## 2023-09-25 DIAGNOSIS — G89.29 CHRONIC LEFT-SIDED LOW BACK PAIN WITH LEFT-SIDED SCIATICA: Primary | ICD-10-CM

## 2023-09-25 DIAGNOSIS — Z00.00 ENCOUNTER FOR MEDICARE ANNUAL WELLNESS EXAM: ICD-10-CM

## 2023-09-25 DIAGNOSIS — I10 HYPERTENSION, UNSPECIFIED TYPE: ICD-10-CM

## 2023-09-25 PROCEDURE — 1159F PR MEDICATION LIST DOCUMENTED IN MEDICAL RECORD: ICD-10-PCS | Mod: ,,, | Performed by: NURSE PRACTITIONER

## 2023-09-25 PROCEDURE — 3078F PR MOST RECENT DIASTOLIC BLOOD PRESSURE < 80 MM HG: ICD-10-PCS | Mod: ,,, | Performed by: NURSE PRACTITIONER

## 2023-09-25 PROCEDURE — 4010F PR ACE/ARB THEARPY RXD/TAKEN: ICD-10-PCS | Mod: ,,, | Performed by: NURSE PRACTITIONER

## 2023-09-25 PROCEDURE — 3044F HG A1C LEVEL LT 7.0%: CPT | Mod: ,,, | Performed by: NURSE PRACTITIONER

## 2023-09-25 PROCEDURE — 3008F BODY MASS INDEX DOCD: CPT | Mod: ,,, | Performed by: NURSE PRACTITIONER

## 2023-09-25 PROCEDURE — 3078F DIAST BP <80 MM HG: CPT | Mod: ,,, | Performed by: NURSE PRACTITIONER

## 2023-09-25 PROCEDURE — 3074F PR MOST RECENT SYSTOLIC BLOOD PRESSURE < 130 MM HG: ICD-10-PCS | Mod: ,,, | Performed by: NURSE PRACTITIONER

## 2023-09-25 PROCEDURE — 3074F SYST BP LT 130 MM HG: CPT | Mod: ,,, | Performed by: NURSE PRACTITIONER

## 2023-09-25 PROCEDURE — 1159F MED LIST DOCD IN RCRD: CPT | Mod: ,,, | Performed by: NURSE PRACTITIONER

## 2023-09-25 PROCEDURE — 3008F PR BODY MASS INDEX (BMI) DOCUMENTED: ICD-10-PCS | Mod: ,,, | Performed by: NURSE PRACTITIONER

## 2023-09-25 PROCEDURE — 4010F ACE/ARB THERAPY RXD/TAKEN: CPT | Mod: ,,, | Performed by: NURSE PRACTITIONER

## 2023-09-25 PROCEDURE — G0439 PR MEDICARE ANNUAL WELLNESS SUBSEQUENT VISIT: ICD-10-PCS | Mod: ,,, | Performed by: NURSE PRACTITIONER

## 2023-09-25 PROCEDURE — G0439 PPPS, SUBSEQ VISIT: HCPCS | Mod: ,,, | Performed by: NURSE PRACTITIONER

## 2023-09-25 PROCEDURE — 3044F PR MOST RECENT HEMOGLOBIN A1C LEVEL <7.0%: ICD-10-PCS | Mod: ,,, | Performed by: NURSE PRACTITIONER

## 2023-09-25 RX ORDER — METHOCARBAMOL 500 MG/1
500 TABLET, FILM COATED ORAL 2 TIMES DAILY
Qty: 20 TABLET | Refills: 0 | Status: SHIPPED | OUTPATIENT
Start: 2023-09-25 | End: 2023-11-29

## 2023-09-25 RX ORDER — IBUPROFEN 800 MG/1
800 TABLET ORAL 2 TIMES DAILY PRN
Qty: 60 TABLET | Refills: 1 | Status: SHIPPED | OUTPATIENT
Start: 2023-09-25 | End: 2024-03-25

## 2023-09-25 RX ORDER — PANTOPRAZOLE SODIUM 40 MG/1
40 TABLET, DELAYED RELEASE ORAL DAILY
Qty: 90 TABLET | Refills: 1 | Status: SHIPPED | OUTPATIENT
Start: 2023-09-25 | End: 2024-03-23

## 2023-09-25 RX ORDER — GABAPENTIN 300 MG/1
300 CAPSULE ORAL 3 TIMES DAILY
Qty: 270 CAPSULE | Refills: 1 | Status: SHIPPED | OUTPATIENT
Start: 2023-09-25 | End: 2024-03-23

## 2023-09-25 RX ORDER — LISINOPRIL 10 MG/1
10 TABLET ORAL DAILY
Qty: 30 TABLET | Refills: 3 | Status: SHIPPED | OUTPATIENT
Start: 2023-09-25 | End: 2024-02-14

## 2023-09-25 NOTE — PROGRESS NOTES
Patient ID: [id number]     Chief Complaint: wellness with labs       HPI:     Teo Santiago is a 62 y.o. male here today for a Medicare Wellness.    Patient has past medical history of hypertension, seasonal allergies, chronic back pain, and depression. Diet described as on healthy. Stays active throughout the day. Wears dentures. Sleep quality described as good. Current smoker 1/2 PPD for the past 40 years).      Today patient is complaining of lower back pain which he does have a history of chronic pain he states that for the last 2 days his back has been bothering him he is not sure if he picked up on something heavy and a  Opioid Screening: Patient medication list reviewed, patient is not taking prescription opioids. Patient is not using additional opioids than prescribed. Patient is at low risk of substance abuse based on this opioid use history.       ----------------------------  BPH (benign prostatic hyperplasia)  GERD (gastroesophageal reflux disease)  Hypertension  Rheumatoid arthritis, unspecified     Past Surgical History:   Procedure Laterality Date    SPINE SURGERY  06/09/2020    METRX DLL LEFT L4/5, 06/09/20, WKN, DR. PONCE.    TONSILLECTOMY         Review of patient's allergies indicates:  No Known Allergies    Outpatient Medications Marked as Taking for the 9/25/23 encounter (Office Visit) with Geraldine Caicedo FNP   Medication Sig Dispense Refill    [DISCONTINUED] ibuprofen (ADVIL,MOTRIN) 800 MG tablet Take 1 tablet (800 mg total) by mouth 2 (two) times daily as needed for Pain. 60 tablet 1    [DISCONTINUED] lisinopriL 10 MG tablet TAKE ONE TABLET BY MOUTH once daily 30 tablet 3       Social History     Socioeconomic History    Marital status:    Tobacco Use    Smoking status: Every Day     Current packs/day: 1.00     Average packs/day: 1 pack/day for 38.0 years (38.0 ttl pk-yrs)     Types: Cigarettes    Smokeless tobacco: Never   Substance and Sexual Activity    Alcohol use: Yes      "Alcohol/week: 6.0 standard drinks of alcohol     Types: 6 Cans of beer per week     Comment: socially    Drug use: Not Currently     Types: Cocaine, Methamphetamines    Sexual activity: Yes        History reviewed. No pertinent family history.     Patient Care Team:  Geraldine Caicedo FNP as PCP - General (Family Medicine)       Subjective:     Review of Systems   All other systems reviewed and are negative.        Patient Reported Health Risk Assessment       Objective:     /75 (BP Location: Right arm)   Pulse 81   Ht 5' 7.01" (1.702 m)   Wt 62.9 kg (138 lb 11.2 oz)   SpO2 97%   BMI 21.72 kg/m²     Physical Exam  Constitutional:       Appearance: Normal appearance.   Cardiovascular:      Rate and Rhythm: Normal rate and regular rhythm.   Pulmonary:      Effort: Pulmonary effort is normal.      Breath sounds: Normal breath sounds.   Abdominal:      General: Bowel sounds are normal.      Palpations: Abdomen is soft.   Musculoskeletal:         General: Normal range of motion.   Skin:     General: Skin is warm and dry.   Neurological:      General: No focal deficit present.      Mental Status: He is alert and oriented to person, place, and time.                No data to display                  9/25/2023     9:00 AM 7/21/2023     9:15 AM 1/24/2023     8:40 AM 12/5/2022     9:30 AM 11/3/2022     7:45 AM 9/22/2022    10:40 AM   Fall Risk Assessment - Outpatient   Mobility Status Ambulatory Ambulatory Ambulatory Ambulatory Ambulatory Ambulatory   Number of falls 1 with injury 0 2+ 0 2+ 2+   Identified as fall risk True False True False True True              Assessment/Plan:       ICD-10-CM ICD-9-CM   1. Chronic left-sided low back pain with left-sided sciatica  M54.42 724.2    G89.29 724.3     338.29   2. Encounter for Medicare annual wellness exam  Z00.00 V70.0   3. Gastroesophageal reflux disease, unspecified whether esophagitis present  K21.9 530.81   4. Hypertension, unspecified type  I10 401.9   5. BPH " with urinary obstruction  N40.1 600.01    N13.8 599.69     1. Chronic left-sided low back pain with left-sided sciatica  -     gabapentin (NEURONTIN) 300 MG capsule; Take 1 capsule (300 mg total) by mouth 3 (three) times daily.  Dispense: 270 capsule; Refill: 1    2. Encounter for Medicare annual wellness exam  Discussed labs and preventative screenings   Overall health status reviewed.    Significant chronic conditions addressed, including ongoing treatment plans.   Good health habits reinforced.    Cardiovascular disease risk factors discussed.   Appropriate recommendations and preventative care medical   information provided with annual wellness exams encouraged.  Vaccination status     3. Gastroesophageal reflux disease, unspecified whether esophagitis present  -     pantoprazole (PROTONIX) 40 MG tablet; Take 1 tablet (40 mg total) by mouth once daily.  Dispense: 90 tablet; Refill: 1  Lifestyle modifications and avoidance of food any precipitating heartburn:    Alcohol, caffeine, chocolate, coffee, citrus foods, onions, garlic, tomato   based foods, peppermint and spicy food.   Weight loss, smoking cessation, elevate head of bed and avoiding lying flat for at least 3 to 4 hours after meals     4. Hypertension, unspecified type  -     lisinopriL 10 MG tablet; Take 1 tablet (10 mg total) by mouth once daily.  Dispense: 30 tablet; Refill: 3    Controlled with medication.   Continue current medication as prescribed   Low salt/ DASH diet   Discussed lifestyle modifications.   encourage aerobic excise at least 30 mins a day   Monitor BP daily goal less than 140/90.   Denies headaches, blurred vision, or dizziness    5. BPH with urinary obstruction  Patient evaluated by [facility] who referred him to [facility] for TUR[facility]    Continue oxybutynin  alfuzosin and finasteride  Other orders  -     ibuprofen (ADVIL,MOTRIN) 800 MG tablet; Take 1 tablet (800 mg total) by mouth 2 (two) times daily as needed for Pain.  Dispense:  60 tablet; Refill: 1  -     methocarbamoL (ROBAXIN) 500 MG Tab; Take 1 tablet (500 mg total) by mouth 2 (two) times a day. for 10 days  Dispense: 20 tablet; Refill: 0           Medicare Annual Wellness and Personalized Prevention Plan:   Fall Risk + Home Safety + Hearing Impairment + Depression Screen + Opioid and Substance Abuse Screening + Cognitive Impairment Screen + Health Risk Assessment all reviewed.     Health Maintenance Topics with due status: Not Due       Topic Last Completion Date    TETANUS VACCINE 07/28/2021    Colorectal Cancer Screening 10/04/2021    LDCT Lung Screen 11/01/2022    Lipid Panel 09/22/2023      The patient's Health Maintenance was reviewed and the following appears to be due at this time:   Health Maintenance Due   Topic Date Due    Hepatitis C Screening  Never done    Pneumococcal Vaccines (Age 0-64) (1 - PCV) Never done    HIV Screening  Never done    Shingles Vaccine (1 of 2) Never done    COVID-19 Vaccine (6 - Mixed Product series) 02/15/2022    Influenza Vaccine (1) Never done       Advance Care Planning   I attest to discussing Advance Care Planning with patient and/or family member.  Education was provided including the importance of the Health Care Power of , Advance Directives, and/or [place] documentation.  The patient expressed understanding to the importance of this information and discussion.       Future Appointments   Date Time Provider Department Center   9/30/2024  8:30 AM Geraldine Caicedo FNP [facility]       No follow-ups on file. In addition to their scheduled follow up, the patient has also been instructed to follow up on as needed basis.

## 2023-11-07 ENCOUNTER — HOSPITAL ENCOUNTER (EMERGENCY)
Facility: HOSPITAL | Age: 63
Discharge: HOME | End: 2023-11-07
Attending: EMERGENCY MEDICINE
Payer: MEDICARE

## 2023-11-07 ENCOUNTER — TELEPHONE (OUTPATIENT)
Dept: FAMILY MEDICINE | Facility: CLINIC | Age: 63
End: 2023-11-07
Payer: MEDICARE

## 2023-11-07 VITALS
DIASTOLIC BLOOD PRESSURE: 92 MMHG | HEIGHT: 65 IN | SYSTOLIC BLOOD PRESSURE: 142 MMHG | OXYGEN SATURATION: 99 % | WEIGHT: 145 LBS | RESPIRATION RATE: 18 BRPM | HEART RATE: 90 BPM | TEMPERATURE: 99 F | BODY MASS INDEX: 24.16 KG/M2

## 2023-11-07 DIAGNOSIS — S61.219A: Primary | ICD-10-CM

## 2023-11-07 PROCEDURE — 90471 IMMUNIZATION ADMIN: CPT | Performed by: EMERGENCY MEDICINE

## 2023-11-07 PROCEDURE — 25000003 PHARM REV CODE 250: Performed by: EMERGENCY MEDICINE

## 2023-11-07 PROCEDURE — 63600175 PHARM REV CODE 636 W HCPCS: Performed by: EMERGENCY MEDICINE

## 2023-11-07 PROCEDURE — 90715 TDAP VACCINE 7 YRS/> IM: CPT | Performed by: EMERGENCY MEDICINE

## 2023-11-07 PROCEDURE — 99284 EMERGENCY DEPT VISIT MOD MDM: CPT

## 2023-11-07 RX ORDER — BUPIVACAINE HYDROCHLORIDE 2.5 MG/ML
5 INJECTION, SOLUTION EPIDURAL; INFILTRATION; INTRACAUDAL
Status: DISCONTINUED | OUTPATIENT
Start: 2023-11-07 | End: 2023-11-07 | Stop reason: HOSPADM

## 2023-11-07 RX ORDER — HYDROCODONE BITARTRATE AND ACETAMINOPHEN 10; 325 MG/1; MG/1
1 TABLET ORAL
Status: COMPLETED | OUTPATIENT
Start: 2023-11-07 | End: 2023-11-07

## 2023-11-07 RX ADMIN — HYDROCODONE BITARTRATE AND ACETAMINOPHEN 1 TABLET: 10; 325 TABLET ORAL at 11:11

## 2023-11-07 RX ADMIN — TETANUS TOXOID, REDUCED DIPHTHERIA TOXOID AND ACELLULAR PERTUSSIS VACCINE, ADSORBED 0.5 ML: 5; 2.5; 8; 8; 2.5 SUSPENSION INTRAMUSCULAR at 11:11

## 2023-11-07 NOTE — DISCHARGE INSTRUCTIONS
Patient will be discharged home.  Keep clean and dry for 24 hours.  Remove bandage with baby shampoo in warm water.  Follow up with the primary care provider for recheck.  Return ER for any worsening symptoms

## 2023-11-07 NOTE — ED PROVIDER NOTES
Encounter Date: 11/7/2023       History     Chief Complaint   Patient presents with    finger laceration     Lac to left pinky finger last night, denies being on bloood thinners, needs T-DAP     62-year-old with laceration to the left 5th digit    The history is provided by the patient. No  was used.     Review of patient's allergies indicates:  No Known Allergies  Past Medical History:   Diagnosis Date    BPH (benign prostatic hyperplasia)     GERD (gastroesophageal reflux disease)     Hypertension     Rheumatoid arthritis, unspecified      Past Surgical History:   Procedure Laterality Date    SPINE SURGERY  06/09/2020    METRX DLL LEFT L4/5, 06/09/20, WKN, DR. PONCE.    TONSILLECTOMY       No family history on file.  Social History     Tobacco Use    Smoking status: Every Day     Current packs/day: 1.00     Average packs/day: 1 pack/day for 38.0 years (38.0 ttl pk-yrs)     Types: Cigarettes    Smokeless tobacco: Never   Substance Use Topics    Alcohol use: Yes     Alcohol/week: 6.0 standard drinks of alcohol     Types: 6 Cans of beer per week     Comment: socially    Drug use: Not Currently     Types: Cocaine, Methamphetamines     Review of Systems   Constitutional: Negative.    HENT: Negative.     Eyes: Negative.    Respiratory: Negative.     Cardiovascular: Negative.    Gastrointestinal: Negative.    Endocrine: Negative.    Genitourinary: Negative.    Musculoskeletal: Negative.    Skin:  Positive for wound.   Allergic/Immunologic: Negative.    Neurological: Negative.    Hematological: Negative.    Psychiatric/Behavioral: Negative.     All other systems reviewed and are negative.      Physical Exam     Initial Vitals [11/07/23 0927]   BP Pulse Resp Temp SpO2   (!) 142/92 90 18 98.9 °F (37.2 °C) 99 %      MAP       --         Physical Exam    Nursing note and vitals reviewed.  Constitutional: He appears well-developed and well-nourished.   HENT:   Head: Normocephalic and atraumatic.   Right Ear:  External ear normal.   Left Ear: External ear normal.   Nose: Nose normal.   Mouth/Throat: Oropharynx is clear and moist.   Eyes: Conjunctivae and EOM are normal. Pupils are equal, round, and reactive to light.   Neck: Neck supple.   Normal range of motion.  Cardiovascular:  Normal rate, regular rhythm, normal heart sounds and intact distal pulses.           Pulmonary/Chest: Breath sounds normal.   Abdominal: Abdomen is soft. Bowel sounds are normal.   Musculoskeletal:         General: Normal range of motion.      Cervical back: Normal range of motion and neck supple.     Neurological: He is alert and oriented to person, place, and time. He has normal strength and normal reflexes. GCS score is 15. GCS eye subscore is 4. GCS verbal subscore is 5. GCS motor subscore is 6.   Skin: Skin is warm and dry. Capillary refill takes less than 2 seconds.   Avulsion to tip of 5th digit.    Psychiatric: He has a normal mood and affect. His behavior is normal. Judgment and thought content normal.         ED Course   Lac Repair    Date/Time: 11/7/2023 11:59 AM    Performed by: Shelly Castellanos NP  Authorized by: Jose Cruz Delcid MD    Consent:     Consent obtained:  Emergent situation    Consent given by:  Patient    Risks, benefits, and alternatives were discussed: yes      Risks discussed:  Infection, pain and retained foreign body  Universal protocol:     Procedure explained and questions answered to patient or proxy's satisfaction: yes      Imaging studies available: yes      Patient identity confirmed:  Verbally with patient and arm band  Anesthesia:     Anesthesia method:  Nerve block    Block location:  Left 5th digit    Block needle gauge:  24 G    Block anesthetic:  Bupivacaine 0.5% w/o epi    Block technique:  Nerve block    Block injection procedure:  Anatomic landmarks identified, introduced needle and negative aspiration for blood    Block outcome:  Anesthesia achieved  Laceration details:     Location:  Finger     Finger location:  L small finger    Length (cm):  1    Depth (mm):  0.5  Pre-procedure details:     Preparation:  Patient was prepped and draped in usual sterile fashion and imaging obtained to evaluate for foreign bodies  Exploration:     Limited defect created (wound extended): no    Treatment:     Area cleansed with:  Saline    Amount of cleaning:  Extensive    Irrigation solution:  Sterile saline    Irrigation method:  Syringe    Visualized foreign bodies/material removed: no      Debridement:  None    Undermining:  None    Scar revision: no    Skin repair:     Repair method:  Steri-Strips    Number of Steri-Strips:  7  Approximation:     Approximation:  Loose  Repair type:     Repair type:  Intermediate  Post-procedure details:     Dressing:  Bulky dressing and non-adherent dressing    Procedure completion:  Tolerated    Labs Reviewed - No data to display       Imaging Results              X-Ray Finger 2 or More Views Left (Final result)  Result time 11/07/23 11:36:32      Final result by Gretel Ramachandran MD (11/07/23 11:36:32)                   Impression:      No acute bony abnormality.      Electronically signed by: Gretel Ramachandran  Date:    11/07/2023  Time:    11:36               Narrative:    EXAMINATION:  XR FINGER 2 OR MORE VIEWS LEFT    CLINICAL HISTORY:  injury;    COMPARISON:  None.    FINDINGS:  There is no acute fracture or malalignment.  Soft tissue swelling is noted.  There is no radiopaque foreign body.                                       Medications   BUPivacaine (PF) 0.25% (2.5 mg/ml) injection 12.5 mg (has no administration in time range)   Tdap (BOOSTRIX) vaccine injection 0.5 mL (0.5 mLs Intramuscular Given 11/7/23 1101)   HYDROcodone-acetaminophen  mg per tablet 1 tablet (1 tablet Oral Given 11/7/23 1100)     Medical Decision Making  Awake alert and oriented 62-year-old male reports to the ER with an avulsion to the left 5th digit.  Patient reports cinder block landed on the  tip of the finger.  1 cm avulsion noted.  Bleeding controlled onset yesterday.  Patient was made up-to-date on tetanus in the ER.  Head atraumatic.  Bilateral breath sounds clear to auscultation respirations even unlabored left hand full range of motion patient able to fan fingers.  Patient able to touch thumb to every finger.  Painful range of motion of the 5th digit but tolerable.  Distal pulse present.  All other review of systems within normal limits.  GCS 15 cranial nerves 2-12 intact neuro focal intact.    Diff Dx:  Avulsion, Laceration, Fx.     Amount and/or Complexity of Data Reviewed  Radiology: ordered.     Details: There is no acute fracture, FB,  soft tissue swelling    Risk  Prescription drug management.                               Clinical Impression:   Final diagnoses:  [S68.219A] Laceration of finger of left hand without damage to nail, initial encounter (Primary)        ED Disposition Condition    Discharge Stable          ED Prescriptions    None       Follow-up Information       Follow up With Specialties Details Why Contact Info    Geraldine Caicedo, FNP Family Medicine  For wound re-check [address]  [facility] [address]  [phone]               Shelly Castellanos NP  11/07/23 0360

## 2023-11-07 NOTE — TELEPHONE ENCOUNTER
----- Message from Jeannette Macias sent at 11/7/2023  8:12 AM CST -----  .Type:  Needs Medical Advice    Who Called: pt  Symptoms (please be specific): pt busted his finger    How long has patient had these symptoms:  yesterday  Pharmacy name and phone #:    Would the patient rather a call back or a response via [facility]? Call back   Best Call Back Number: [phone]  Additional Information: pt is in pain needs to see someone , he states the meat is coming off the bone ...

## 2023-11-10 DIAGNOSIS — Z87.891 PERSONAL HISTORY OF TOBACCO USE, PRESENTING HAZARDS TO HEALTH: Primary | ICD-10-CM

## 2023-11-14 ENCOUNTER — HOSPITAL ENCOUNTER (OUTPATIENT)
Dept: WOUND CARE | Facility: HOSPITAL | Age: 63
Discharge: HOME | End: 2023-11-14
Attending: PEDIATRICS
Payer: MEDICARE

## 2023-11-14 VITALS
TEMPERATURE: 99 F | OXYGEN SATURATION: 99 % | DIASTOLIC BLOOD PRESSURE: 83 MMHG | HEART RATE: 105 BPM | SYSTOLIC BLOOD PRESSURE: 123 MMHG | RESPIRATION RATE: 18 BRPM

## 2023-11-14 DIAGNOSIS — S61.217A LACERATION OF LEFT LITTLE FINGER WITHOUT FOREIGN BODY WITHOUT DAMAGE TO NAIL, INITIAL ENCOUNTER: Primary | ICD-10-CM

## 2023-11-14 PROCEDURE — 99203 PR OFFICE/OUTPT VISIT, NEW, LEVL III, 30-44 MIN: ICD-10-PCS | Mod: ,,, | Performed by: PEDIATRICS

## 2023-11-14 PROCEDURE — 99213 OFFICE O/P EST LOW 20 MIN: CPT

## 2023-11-14 PROCEDURE — 99203 OFFICE O/P NEW LOW 30 MIN: CPT | Mod: ,,, | Performed by: PEDIATRICS

## 2023-11-14 PROCEDURE — 27000999 HC MEDICAL RECORD PHOTO DOCUMENTATION

## 2023-11-14 RX ORDER — VIBEGRON 75 MG/1
1 TABLET, FILM COATED ORAL
COMMUNITY
Start: 2023-10-30

## 2023-11-14 NOTE — PATIENT INSTRUCTIONS
Cleanse wound with baby shampoo, rinse with bottled water.    Primary dressing: mesalt   Secondary dressing: gauze, elastic bandage    Frequency: daily  Follow-up: 1 week

## 2023-11-14 NOTE — PROGRESS NOTES
Subjective:       Patient ID: Teo Santiago is a 62 y.o. male.    Chief Complaint: Wound Consult (Referred for Left 5th finger laceration.   Seen in emergency room on 11/7/23.  Negative xray.   Given tetanus in ED.  Here for evaluation and treatment)    HPI patient crusting injury on 11/07/2023.  Patient said he had rocks in the wound.  This was Steri-Strips after being cleaned.  Patient here today for evaluation  Review of Systems      Objective:      Temp:  [98.9 °F (37.2 °C)]   Pulse:  [105]   Resp:  [18]   BP: (123)/(83)   SpO2:  [99 %]   Physical Exam       Altered Skin Integrity 11/14/23 0815 Left Finger, fifth Laceration (Active)   11/14/23 0815   Altered Skin Integrity Present on Admission - Did Patient arrive to the hospital with altered skin?: yes   Side: Left   Orientation:    Location: Finger, fifth   Wound Number:    Is this injury device related?:    Primary Wound Type: Laceration   Description of Altered Skin Integrity:    Ankle-Brachial Index:    Pulses:    Removal Indication and Assessment:    Wound Outcome:    (Retired) Wound Length (cm):    (Retired) Wound Width (cm):    (Retired) Depth (cm):    Wound Description (Comments):    Removal Indications:    Wound Image   11/14/23 0817   Dressing Appearance Intact;Dried drainage 11/14/23 0817   Drainage Amount Scant 11/14/23 0817   Drainage Characteristics/Odor No odor;Bleeding controlled 11/14/23 0817   Appearance Red;Pink;Tan;Not granulating 11/14/23 0817   Tissue loss description Full thickness 11/14/23 0817   Red (%), Wound Tissue Color 80 % 11/14/23 0817   Yellow (%), Wound Tissue Color 20 % 11/14/23 0817   Periwound Area Dry;Edematous 11/14/23 0817   Wound Edges Irregular 11/14/23 0817   Wound Length (cm) 0.6 cm 11/14/23 0817   Wound Width (cm) 2 cm 11/14/23 0817   Wound Depth (cm) 0.1 cm 11/14/23 0817   Wound Volume (cm^3) 0.12 cm^3 11/14/23 0817   Wound Surface Area (cm^2) 1.2 cm^2 11/14/23 0817   Care Cleansed with:;Antimicrobial agent  11/14/23 0817   Dressing Applied;Sodium chloride impregnated;Gauze;Elastic bandage 11/14/23 0817         Assessment:     Today there is swelling of the distal phalanx.  Slightly open wound is 0.6 cm x 2 cm.  This does not appear to be infected.  There is some decrease motion because of the swelling.  Good capillary refill.  Area was cleaned and Mesalt applied.  Patient will change this every day and patient will return in 1 week for physician visit    ICD-10-CM ICD-9-CM   1. Laceration of left little finger without foreign body without damage to nail, initial encounter  S61.217A 883.0         Plan:   Tissue pathology and/or culture taken:  [] Yes [x] No   Sharp debridement performed:   [] Yes [x] No   Labs ordered this visit:   [] Yes [x] No   Imaging ordered this visit:   [] Yes [x] No           Orders Placed This Encounter   Procedures    Change dressing     Cleanse wound with baby shampoo, rinse with bottled water.    Primary dressing: mesalt   Secondary dressing: gauze, elastic bandage    Frequency: daily  Follow-up: 1 week        Follow up in about 1 week (around 11/21/2023) for md visit .

## 2023-11-29 DIAGNOSIS — M54.42 CHRONIC LEFT-SIDED LOW BACK PAIN WITH LEFT-SIDED SCIATICA: Primary | ICD-10-CM

## 2023-11-29 DIAGNOSIS — G89.29 CHRONIC LEFT-SIDED LOW BACK PAIN WITH LEFT-SIDED SCIATICA: Primary | ICD-10-CM

## 2023-11-29 RX ORDER — METHOCARBAMOL 500 MG/1
500 TABLET, FILM COATED ORAL 2 TIMES DAILY
Qty: 20 TABLET | Refills: 0 | Status: SHIPPED | OUTPATIENT
Start: 2023-11-29

## 2023-12-06 ENCOUNTER — HOSPITAL ENCOUNTER (OUTPATIENT)
Dept: RADIOLOGY | Facility: HOSPITAL | Age: 63
Discharge: HOME | End: 2023-12-06
Attending: NURSE PRACTITIONER
Payer: MEDICARE

## 2023-12-06 DIAGNOSIS — Z87.891 PERSONAL HISTORY OF TOBACCO USE, PRESENTING HAZARDS TO HEALTH: ICD-10-CM

## 2023-12-06 PROCEDURE — 71271 CT THORAX LUNG CANCER SCR C-: CPT | Mod: TC

## 2023-12-08 ENCOUNTER — TELEPHONE (OUTPATIENT)
Dept: FAMILY MEDICINE | Facility: CLINIC | Age: 63
End: 2023-12-08
Payer: MEDICARE

## 2023-12-08 NOTE — TELEPHONE ENCOUNTER
----- Message from ANDREW Hughes sent at 12/6/2023  6:40 PM CST -----  CT lung WNL  . Repeat in 1 year.

## 2023-12-19 ENCOUNTER — TELEPHONE (OUTPATIENT)
Dept: FAMILY MEDICINE | Facility: CLINIC | Age: 63
End: 2023-12-19
Payer: MEDICARE

## 2023-12-19 NOTE — TELEPHONE ENCOUNTER
----- Message from Rekha Hollis sent at 12/19/2023  3:33 PM CST -----  Regarding: call back  .Type:  Needs Medical Advice    Who Called: radha  Would the patient rather a call back or a response via [facility]? cb  Best Call Back Number: [phone]  Additional Information: Pt is calling again regarding a pulled muscle I sent a message at 7:35am regarding this he states no one has responded back to him on the next steps pls call back pt

## 2023-12-19 NOTE — TELEPHONE ENCOUNTER
Patient in office with wife for office visit.  Provider instructed patient to proceed to the front and check himself in for a visit.

## 2023-12-19 NOTE — TELEPHONE ENCOUNTER
Returned patients call, instructed patient today when wife was seen and discharged he was told to go to the front and check in & he would be seen as a patient to assess his symptoms.  Patient states he called at 730 to be placed on schedule and refused to check in when his wife was discharged, called me a little b**ch, and states was not sitting in waiting room in pain and hung up.

## 2023-12-19 NOTE — TELEPHONE ENCOUNTER
----- Message from Rekha Hollis sent at 12/19/2023  7:35 AM CST -----  Regarding: call back  .Type:  Needs Medical Advice    Who Called: radha  Symptoms (please be specific): pulled a muscle on back    How long has patient had these symptoms:  12/16  Pharmacy name and phone #:    Would the patient rather a call back or a response via [facility]?   Best Call Back Number: [phone]  Additional Information: pt states he think he pulled a muscle Saturday and asking can he come in to get a shot today for the pain

## 2024-02-13 DIAGNOSIS — I10 HYPERTENSION, UNSPECIFIED TYPE: ICD-10-CM

## 2024-02-14 RX ORDER — LISINOPRIL 10 MG/1
10 TABLET ORAL
Qty: 30 TABLET | Refills: 3 | Status: SHIPPED | OUTPATIENT
Start: 2024-02-14 | End: 2024-06-10

## 2024-03-25 ENCOUNTER — HOSPITAL ENCOUNTER (EMERGENCY)
Facility: HOSPITAL | Age: 64
Discharge: HOME | End: 2024-03-25
Attending: EMERGENCY MEDICINE

## 2024-03-25 VITALS
HEART RATE: 100 BPM | BODY MASS INDEX: 23.32 KG/M2 | RESPIRATION RATE: 20 BRPM | WEIGHT: 140 LBS | HEIGHT: 65 IN | TEMPERATURE: 98 F | DIASTOLIC BLOOD PRESSURE: 88 MMHG | SYSTOLIC BLOOD PRESSURE: 133 MMHG | OXYGEN SATURATION: 98 %

## 2024-03-25 DIAGNOSIS — T14.90XA TRAUMA: ICD-10-CM

## 2024-03-25 DIAGNOSIS — S20.212A RIB CONTUSION, LEFT, INITIAL ENCOUNTER: Primary | ICD-10-CM

## 2024-03-25 PROCEDURE — 96372 THER/PROPH/DIAG INJ SC/IM: CPT | Performed by: EMERGENCY MEDICINE

## 2024-03-25 PROCEDURE — 99284 EMERGENCY DEPT VISIT MOD MDM: CPT | Mod: 25

## 2024-03-25 PROCEDURE — 63600175 PHARM REV CODE 636 W HCPCS: Performed by: EMERGENCY MEDICINE

## 2024-03-25 RX ORDER — KETOROLAC TROMETHAMINE 30 MG/ML
30 INJECTION, SOLUTION INTRAMUSCULAR; INTRAVENOUS
Status: COMPLETED | OUTPATIENT
Start: 2024-03-25 | End: 2024-03-25

## 2024-03-25 RX ORDER — HYDROCODONE BITARTRATE AND ACETAMINOPHEN 7.5; 325 MG/1; MG/1
1 TABLET ORAL EVERY 6 HOURS PRN
Qty: 12 TABLET | Refills: 0 | Status: SHIPPED | OUTPATIENT
Start: 2024-03-25

## 2024-03-25 RX ORDER — IBUPROFEN 800 MG/1
800 TABLET ORAL EVERY 6 HOURS PRN
Qty: 20 TABLET | Refills: 0 | Status: SHIPPED | OUTPATIENT
Start: 2024-03-25

## 2024-03-25 RX ADMIN — KETOROLAC TROMETHAMINE 30 MG: 30 INJECTION, SOLUTION INTRAMUSCULAR at 09:03
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
## 2024-06-10 DIAGNOSIS — I10 HYPERTENSION, UNSPECIFIED TYPE: ICD-10-CM

## 2024-06-10 RX ORDER — LISINOPRIL 10 MG/1
10 TABLET ORAL
Qty: 30 TABLET | Refills: 3 | Status: SHIPPED | OUTPATIENT
Start: 2024-06-10

## 2024-06-11 ENCOUNTER — TELEPHONE (OUTPATIENT)
Dept: FAMILY MEDICINE | Facility: CLINIC | Age: 64
End: 2024-06-11
Payer: MEDICAID

## 2024-06-11 RX ORDER — AMOXICILLIN AND CLAVULANATE POTASSIUM 875; 125 MG/1; MG/1
1 TABLET, FILM COATED ORAL EVERY 12 HOURS
Qty: 14 TABLET | Refills: 0 | Status: SHIPPED | OUTPATIENT
Start: 2024-06-11 | End: 2024-06-18

## 2024-07-17 ENCOUNTER — OFFICE VISIT (OUTPATIENT)
Dept: FAMILY MEDICINE | Facility: CLINIC | Age: 64
End: 2024-07-17
Payer: MEDICARE

## 2024-07-17 VITALS
BODY MASS INDEX: 23.06 KG/M2 | HEART RATE: 84 BPM | RESPIRATION RATE: 16 BRPM | DIASTOLIC BLOOD PRESSURE: 79 MMHG | WEIGHT: 138.38 LBS | HEIGHT: 65 IN | TEMPERATURE: 98 F | SYSTOLIC BLOOD PRESSURE: 121 MMHG | OXYGEN SATURATION: 99 %

## 2024-07-17 DIAGNOSIS — J32.9 SINUSITIS, UNSPECIFIED CHRONICITY, UNSPECIFIED LOCATION: ICD-10-CM

## 2024-07-17 DIAGNOSIS — G89.29 CHRONIC LEFT-SIDED LOW BACK PAIN WITH LEFT-SIDED SCIATICA: Primary | ICD-10-CM

## 2024-07-17 DIAGNOSIS — M54.42 CHRONIC LEFT-SIDED LOW BACK PAIN WITH LEFT-SIDED SCIATICA: Primary | ICD-10-CM

## 2024-07-17 PROCEDURE — 3078F DIAST BP <80 MM HG: CPT | Mod: ,,, | Performed by: NURSE PRACTITIONER

## 2024-07-17 PROCEDURE — 4010F ACE/ARB THERAPY RXD/TAKEN: CPT | Mod: ,,, | Performed by: NURSE PRACTITIONER

## 2024-07-17 PROCEDURE — 3008F BODY MASS INDEX DOCD: CPT | Mod: ,,, | Performed by: NURSE PRACTITIONER

## 2024-07-17 PROCEDURE — 96372 THER/PROPH/DIAG INJ SC/IM: CPT | Mod: ,,, | Performed by: NURSE PRACTITIONER

## 2024-07-17 PROCEDURE — 99214 OFFICE O/P EST MOD 30 MIN: CPT | Mod: 25,,, | Performed by: NURSE PRACTITIONER

## 2024-07-17 PROCEDURE — 1160F RVW MEDS BY RX/DR IN RCRD: CPT | Mod: ,,, | Performed by: NURSE PRACTITIONER

## 2024-07-17 PROCEDURE — 3074F SYST BP LT 130 MM HG: CPT | Mod: ,,, | Performed by: NURSE PRACTITIONER

## 2024-07-17 PROCEDURE — 1159F MED LIST DOCD IN RCRD: CPT | Mod: ,,, | Performed by: NURSE PRACTITIONER

## 2024-07-17 RX ORDER — ALBUTEROL SULFATE 90 UG/1
2 AEROSOL, METERED RESPIRATORY (INHALATION) EVERY 6 HOURS PRN
Qty: 18 G | Refills: 0 | Status: SHIPPED | OUTPATIENT
Start: 2024-07-17 | End: 2025-07-17

## 2024-07-17 RX ORDER — AMOXICILLIN AND CLAVULANATE POTASSIUM 875; 125 MG/1; MG/1
1 TABLET, FILM COATED ORAL EVERY 12 HOURS
Qty: 14 TABLET | Refills: 0 | Status: SHIPPED | OUTPATIENT
Start: 2024-07-17

## 2024-07-17 RX ORDER — DEXAMETHASONE SODIUM PHOSPHATE 4 MG/ML
8 INJECTION, SOLUTION INTRA-ARTICULAR; INTRALESIONAL; INTRAMUSCULAR; INTRAVENOUS; SOFT TISSUE
Status: COMPLETED | OUTPATIENT
Start: 2024-07-17 | End: 2024-07-17

## 2024-07-17 RX ORDER — CETIRIZINE HYDROCHLORIDE 10 MG/1
10 TABLET ORAL DAILY
Qty: 30 TABLET | Refills: 6 | Status: SHIPPED | OUTPATIENT
Start: 2024-07-17 | End: 2025-07-17

## 2024-07-17 RX ADMIN — DEXAMETHASONE SODIUM PHOSPHATE 8 MG: 4 INJECTION, SOLUTION INTRA-ARTICULAR; INTRALESIONAL; INTRAMUSCULAR; INTRAVENOUS; SOFT TISSUE at 09:07

## 2024-07-17 NOTE — PROGRESS NOTES
SUBJECTIVE:     History of Present Illness      Chief Complaint: Follow-up (Patient states need COPD inhaler, and lost sinus/allergy meds wants refill.  C/O having sinus infection, sinus congestion, headache, mucous yellow, cough x 3 months.)    HPI:  Patient is a 63 y.o. year old male who presents to clinic for complaints of facial pressure sinus congestion.  Patient also states that his lower back has been flaring up.  He has been coughing up mucus for the past 2-3 weeks.  Denies fevers, body aches or chills    Review of Systems:    Review of Systems    12 point review of systems conducted, negative except as stated in the history of present illness. See HPI for details.     Previous History    Geraldine Caicedo, ANDREW  Review of patient's allergies indicates:  No Known Allergies    Past Medical History:   Diagnosis Date    BPH (benign prostatic hyperplasia)     COPD (chronic obstructive pulmonary disease)     GERD (gastroesophageal reflux disease)     Hypertension     Rheumatoid arthritis, unspecified      Current Outpatient Medications   Medication Instructions    albuterol (PROVENTIL HFA) 90 mcg/actuation inhaler 2 puffs, Inhalation, Every 6 hours PRN, Rescue    amoxicillin-clavulanate 875-125mg (AUGMENTIN) 875-125 mg per tablet 1 tablet, Oral, Every 12 hours    cetirizine (ZYRTEC) 10 mg, Oral, Daily    gabapentin (NEURONTIN) 300 mg, Oral, 3 times daily    GEMTESA 75 mg Tab 1 tablet, Oral    HYDROcodone-acetaminophen (NORCO) 7.5-325 mg per tablet 1 tablet, Oral, Every 6 hours PRN    ibuprofen (ADVIL,MOTRIN) 800 mg, Oral, Every 6 hours PRN    lisinopriL 10 mg, Oral    methocarbamoL (ROBAXIN) 500 mg, Oral, 2 times daily    pantoprazole (PROTONIX) 40 mg, Oral, Daily     Past Surgical History:   Procedure Laterality Date    SPINE SURGERY  06/09/2020    METRX DLL LEFT L4/5, 06/09/20, DR. ROSARIO KELLEY.    TONSILLECTOMY       No family history on file.    Social History     Tobacco Use    Smoking status: Every Day      "Current packs/day: 1.00     Average packs/day: 1 pack/day for 38.0 years (38.0 ttl pk-yrs)     Types: Cigarettes    Smokeless tobacco: Never   Substance Use Topics    Alcohol use: Yes     Alcohol/week: 6.0 standard drinks of alcohol     Types: 6 Cans of beer per week     Comment: socially    Drug use: Not Currently     Types: Cocaine, Methamphetamines        Health Maintenance      Health Maintenance   Topic Date Due    Hepatitis C Screening  Never done    Shingles Vaccine (1 of 2) Never done    LDCT Lung Screen  12/06/2024    Lipid Panel  09/22/2028    Colorectal Cancer Screening  10/04/2031    TETANUS VACCINE  11/07/2033       OBJECTIVE:     Physical Exam      Vital Signs Reviewed   Visit Vitals  /79 (BP Location: Left arm, Patient Position: Sitting)   Pulse 84   Temp 98.2 °F (36.8 °C) (Oral)   Resp 16   Ht 5' 5" (1.651 m)   Wt 62.8 kg (138 lb 6.4 oz)   SpO2 99%   BMI 23.03 kg/m²       Physical Exam    Physical Exam:  General: Alert, well nourished, no acute distress, non-toxic appearing.   Eyes: Anicteric sclera, without conjunctival injection, normal lids, no purulent drainage, EOMs grossly intact.   Ears: No tragal tenderness. Tympanic membranes intact, pearly grey, without effusion or erythema and with a positive light reflex.   Mouth: Posterior pharynx without erythema. No exudate, ulcerations, or lesion. No tonsillar swelling.   Neck: Supple, full ROM, no rigidity, no cervical adenopathy.   Cardio: Normal rate and rhythm    Resp: Respirations even and unlabored, clear to auscultation bilaterally.   Abd: No ecchymosis or distension. Normal bowel sounds in all 4 quadrants. No tenderness to palpation. No rebound tenderness or guarding. No CVA tenderness.   Skin: No rashes or open lesions noted.   MSK: No swelling. No abrasions or signs of trauma. Ambulating without assistance.   Neuro: Alert,oriented No focal deficits noted. Facial expressions even.   Psych: Cooperative, Normal affect      Procedures  "   Procedures     Labs     Results for orders placed or performed in visit on 09/22/23   PSA, Screening   Result Value Ref Range    Prostate Specific Antigen 3.51 <=4.00 ng/mL   Vitamin D   Result Value Ref Range    Vitamin D 41.2 30.0 - 80.0 ng/mL   Comprehensive Metabolic Panel   Result Value Ref Range    Sodium 139 136 - 145 mmol/L    Potassium 4.7 3.5 - 5.1 mmol/L    Chloride 105 98 - 107 mmol/L    CO2 26 23 - 31 mmol/L    Glucose 85 82 - 115 mg/dL    Blood Urea Nitrogen 15.3 8.4 - 25.7 mg/dL    Creatinine 1.08 0.73 - 1.18 mg/dL    Calcium 10.3 (H) 8.8 - 10.0 mg/dL    Protein Total 8.9 (H) 5.8 - 7.6 gm/dL    Albumin 4.5 3.4 - 4.8 g/dL    Globulin 4.4 (H) 2.4 - 3.5 gm/dL    Albumin/Globulin Ratio 1.0 (L) 1.1 - 2.0 ratio    Bilirubin Total 0.6 <=1.5 mg/dL     40 - 150 unit/L    ALT 18 0 - 55 unit/L    AST 21 5 - 34 unit/L    eGFR >60 mls/min/1.73/m2   Hemoglobin A1C   Result Value Ref Range    Hemoglobin A1c 5.0 <=7.0 %    Estimated Average Glucose 96.8 mg/dL   Lipid Panel   Result Value Ref Range    Cholesterol Total 143 <=200 mg/dL    HDL Cholesterol 30 (L) 35 - 60 mg/dL    Triglyceride 130 34 - 140 mg/dL    Cholesterol/HDL Ratio 5 0 - 5    Very Low Density Lipoprotein 26     LDL Cholesterol 87.00 50.00 - 140.00 mg/dL   TSH   Result Value Ref Range    TSH 1.628 0.350 - 4.940 uIU/mL   CBC with Differential   Result Value Ref Range    WBC 7.23 4.50 - 11.50 x10(3)/mcL    RBC 4.85 4.70 - 6.10 x10(6)/mcL    Hgb 15.7 14.0 - 18.0 g/dL    Hct 49.0 42.0 - 52.0 %    .0 (H) 80.0 - 94.0 fL    MCH 32.4 (H) 27.0 - 31.0 pg    MCHC 32.0 (L) 33.0 - 36.0 g/dL    RDW 11.4 (L) 11.5 - 17.0 %    Platelet 256 130 - 400 x10(3)/mcL    MPV 9.1 7.4 - 10.4 fL    Neut % 54.2 %    Lymph % 27.5 %    Mono % 6.8 %    Eos % 10.8 %    Basophil % 0.7 %    Lymph # 1.99 0.6 - 4.6 x10(3)/mcL    Neut # 3.92 2.1 - 9.2 x10(3)/mcL    Mono # 0.49 0.1 - 1.3 x10(3)/mcL    Eos # 0.78 0 - 0.9 x10(3)/mcL    Baso # 0.05 <=0.2 x10(3)/mcL    IG# 0.00  "0 - 0.04 x10(3)/mcL    IG% 0.0 %       Chemistry:  Lab Results   Component Value Date     09/22/2023    K 4.7 09/22/2023    BUN 15.3 09/22/2023    CREATININE 1.08 09/22/2023    EGFRNORACEVR >60 09/22/2023    GLUCOSE 85 09/22/2023    CALCIUM 10.3 (H) 09/22/2023    ALKPHOS 112 09/22/2023    LABPROT 8.9 (H) 09/22/2023    ALBUMIN 4.5 09/22/2023    AST 21 09/22/2023    ALT 18 09/22/2023    [id number] 41.2 09/22/2023    TSH 1.628 09/22/2023    PSA 3.51 09/22/2023        Lab Results   Component Value Date    HGBA1C 5.0 09/22/2023        Hematology:  Lab Results   Component Value Date    WBC 7.23 09/22/2023    HGB 15.7 09/22/2023    HCT 49.0 09/22/2023     09/22/2023       Lipid Panel:  Lab Results   Component Value Date    CHOL 143 09/22/2023    HDL 30 (L) 09/22/2023    LDL 87.00 09/22/2023    TRIG 130 09/22/2023    TOTALCHOLEST 5 09/22/2023        Urine:  No results found for: "COLORUA", "APPEARANCEUA", "SGUA", "PHUA", "PROTEINUA", "GLUCOSEUA", "KETONESUA", "BLOODUA", "NITRITESUA", "LEUKOCYTESUR", "RBCUA", "WBCUA", "BACTERIA", "SQEPUA", "HYALINECASTS", "CREATRANDUR", "PROTEINURINE", "UPROTCREA"      Assessment            ICD-10-CM ICD-9-CM   1. Chronic left-sided low back pain with left-sided sciatica  M54.42 724.2    G89.29 724.3     338.29   2. Sinusitis, unspecified chronicity, unspecified location  J32.9 473.9       Plan       1. Chronic left-sided low back pain with left-sided sciatica  - dexAMETHasone injection 8 mg    2. Sinusitis, unspecified chronicity, unspecified location  - cetirizine (ZYRTEC) 10 MG tablet; Take 1 tablet (10 mg total) by mouth once daily.  Dispense: 30 tablet; Refill: 6  - amoxicillin-clavulanate 875-125mg (AUGMENTIN) 875-125 mg per tablet; Take 1 tablet by mouth every 12 (twelve) hours.  Dispense: 14 tablet; Refill: 0  - albuterol (PROVENTIL HFA) 90 mcg/actuation inhaler; Inhale 2 puffs into the lungs every 6 (six) hours as needed for Wheezing. Rescue  Dispense: 18 g; Refill: " 0    Orders Placed This Encounter    dexAMETHasone injection 8 mg    cetirizine (ZYRTEC) 10 MG tablet    amoxicillin-clavulanate 875-125mg (AUGMENTIN) 875-125 mg per tablet    albuterol (PROVENTIL HFA) 90 mcg/actuation inhaler      Medication List with Changes/Refills   New Medications    ALBUTEROL (PROVENTIL HFA) 90 MCG/ACTUATION INHALER    Inhale 2 puffs into the lungs every 6 (six) hours as needed for Wheezing. Rescue    AMOXICILLIN-CLAVULANATE 875-125MG (AUGMENTIN) 875-125 MG PER TABLET    Take 1 tablet by mouth every 12 (twelve) hours.    CETIRIZINE (ZYRTEC) 10 MG TABLET    Take 1 tablet (10 mg total) by mouth once daily.   Current Medications    GABAPENTIN (NEURONTIN) 300 MG CAPSULE    Take 1 capsule (300 mg total) by mouth 3 (three) times daily.    GEMTESA 75 MG TAB    Take 1 tablet by mouth.    HYDROCODONE-ACETAMINOPHEN (NORCO) 7.5-325 MG PER TABLET    Take 1 tablet by mouth every 6 (six) hours as needed for Pain.    IBUPROFEN (ADVIL,MOTRIN) 800 MG TABLET    Take 1 tablet (800 mg total) by mouth every 6 (six) hours as needed for Pain.    LISINOPRIL 10 MG TABLET    Take one tablet by mouth once daily    METHOCARBAMOL (ROBAXIN) 500 MG TAB    Take one tablet by mouth twice daily for 10 days    PANTOPRAZOLE (PROTONIX) 40 MG TABLET    Take 1 tablet (40 mg total) by mouth once daily.       No follow-ups on file.   No follow-ups on file. In addition to their scheduled follow up, the patient has also been instructed to follow up on as needed basis.   Future Appointments   Date Time Provider Department Center   9/30/2024  8:30 AM Geraldine Caicedo FNP [facility]

## 2024-07-17 NOTE — PROGRESS NOTES
Patient given Decadron 8 mg in the right gluteal without difficulty.  Patient tolerated well without complaints.  Instructed to call prn prior to next scheduled appointment.

## 2024-09-17 DIAGNOSIS — Z13.29 THYROID DISORDER SCREENING: ICD-10-CM

## 2024-09-17 DIAGNOSIS — E55.9 VITAMIN D DEFICIENCY: ICD-10-CM

## 2024-09-17 DIAGNOSIS — I10 HYPERTENSION, UNSPECIFIED TYPE: Primary | ICD-10-CM

## 2024-09-17 DIAGNOSIS — Z12.5 SCREENING PSA (PROSTATE SPECIFIC ANTIGEN): ICD-10-CM

## 2024-09-17 DIAGNOSIS — Z00.00 WELLNESS EXAMINATION: ICD-10-CM

## 2024-09-17 DIAGNOSIS — Z13.1 DIABETES MELLITUS SCREENING: ICD-10-CM

## 2024-10-04 DIAGNOSIS — I10 HYPERTENSION, UNSPECIFIED TYPE: ICD-10-CM

## 2024-10-04 RX ORDER — LISINOPRIL 10 MG/1
10 TABLET ORAL
Qty: 30 TABLET | Refills: 3 | Status: SHIPPED | OUTPATIENT
Start: 2024-10-04

## 2024-10-28 ENCOUNTER — TELEPHONE (OUTPATIENT)
Dept: FAMILY MEDICINE | Facility: CLINIC | Age: 64
End: 2024-10-28
Payer: MEDICARE

## 2024-11-02 ENCOUNTER — LAB VISIT (OUTPATIENT)
Dept: LAB | Facility: HOSPITAL | Age: 64
End: 2024-11-02
Attending: NURSE PRACTITIONER
Payer: MEDICARE

## 2024-11-02 DIAGNOSIS — Z13.1 DIABETES MELLITUS SCREENING: ICD-10-CM

## 2024-11-02 DIAGNOSIS — E55.9 VITAMIN D DEFICIENCY: ICD-10-CM

## 2024-11-02 DIAGNOSIS — Z13.29 THYROID DISORDER SCREENING: ICD-10-CM

## 2024-11-02 DIAGNOSIS — Z12.5 SCREENING PSA (PROSTATE SPECIFIC ANTIGEN): ICD-10-CM

## 2024-11-02 DIAGNOSIS — I10 HYPERTENSION, UNSPECIFIED TYPE: ICD-10-CM

## 2024-11-02 DIAGNOSIS — Z00.00 WELLNESS EXAMINATION: ICD-10-CM

## 2024-11-02 LAB
25(OH)D3+25(OH)D2 SERPL-MCNC: 28 NG/ML (ref 30–80)
ALBUMIN SERPL-MCNC: 4 G/DL (ref 3.4–4.8)
ALBUMIN/GLOB SERPL: 1.1 RATIO (ref 1.1–2)
ALP SERPL-CCNC: 105 UNIT/L (ref 40–150)
ALT SERPL-CCNC: 30 UNIT/L (ref 0–55)
ANION GAP SERPL CALC-SCNC: 6 MEQ/L
AST SERPL-CCNC: 23 UNIT/L (ref 5–34)
BASOPHILS # BLD AUTO: 0.03 X10(3)/MCL
BASOPHILS NFR BLD AUTO: 0.5 %
BILIRUB SERPL-MCNC: 0.6 MG/DL
BUN SERPL-MCNC: 11.7 MG/DL (ref 8.4–25.7)
CALCIUM SERPL-MCNC: 9.9 MG/DL (ref 8.8–10)
CHLORIDE SERPL-SCNC: 110 MMOL/L (ref 98–107)
CHOLEST SERPL-MCNC: 166 MG/DL
CHOLEST/HDLC SERPL: 5 {RATIO} (ref 0–5)
CO2 SERPL-SCNC: 26 MMOL/L (ref 23–31)
CREAT SERPL-MCNC: 1.08 MG/DL (ref 0.72–1.25)
CREAT/UREA NIT SERPL: 11
EOSINOPHIL # BLD AUTO: 0.58 X10(3)/MCL (ref 0–0.9)
EOSINOPHIL NFR BLD AUTO: 9.3 %
ERYTHROCYTE [DISTWIDTH] IN BLOOD BY AUTOMATED COUNT: 11.9 % (ref 11.5–17)
EST. AVERAGE GLUCOSE BLD GHB EST-MCNC: 102.5 MG/DL
GFR SERPLBLD CREATININE-BSD FMLA CKD-EPI: >60 ML/MIN/1.73/M2
GLOBULIN SER-MCNC: 3.6 GM/DL (ref 2.4–3.5)
GLUCOSE SERPL-MCNC: 106 MG/DL (ref 82–115)
HBA1C MFR BLD: 5.2 %
HCT VFR BLD AUTO: 48.9 % (ref 42–52)
HDLC SERPL-MCNC: 33 MG/DL (ref 35–60)
HGB BLD-MCNC: 16.5 G/DL (ref 14–18)
IMM GRANULOCYTES # BLD AUTO: 0.01 X10(3)/MCL (ref 0–0.04)
IMM GRANULOCYTES NFR BLD AUTO: 0.2 %
LDLC SERPL CALC-MCNC: 106 MG/DL (ref 50–140)
LYMPHOCYTES # BLD AUTO: 2.13 X10(3)/MCL (ref 0.6–4.6)
LYMPHOCYTES NFR BLD AUTO: 34.1 %
MCH RBC QN AUTO: 33.8 PG (ref 27–31)
MCHC RBC AUTO-ENTMCNC: 33.7 G/DL (ref 33–36)
MCV RBC AUTO: 100.2 FL (ref 80–94)
MONOCYTES # BLD AUTO: 0.47 X10(3)/MCL (ref 0.1–1.3)
MONOCYTES NFR BLD AUTO: 7.5 %
NEUTROPHILS # BLD AUTO: 3.02 X10(3)/MCL (ref 2.1–9.2)
NEUTROPHILS NFR BLD AUTO: 48.4 %
PLATELET # BLD AUTO: 254 X10(3)/MCL (ref 130–400)
PMV BLD AUTO: 9.3 FL (ref 7.4–10.4)
POTASSIUM SERPL-SCNC: 4.6 MMOL/L (ref 3.5–5.1)
PROT SERPL-MCNC: 7.6 GM/DL (ref 5.8–7.6)
PSA SERPL-MCNC: 3.52 NG/ML
RBC # BLD AUTO: 4.88 X10(6)/MCL (ref 4.7–6.1)
SODIUM SERPL-SCNC: 142 MMOL/L (ref 136–145)
TRIGL SERPL-MCNC: 134 MG/DL (ref 34–140)
TSH SERPL-ACNC: 2.15 UIU/ML (ref 0.35–4.94)
VLDLC SERPL CALC-MCNC: 27 MG/DL
WBC # BLD AUTO: 6.24 X10(3)/MCL (ref 4.5–11.5)

## 2024-11-02 PROCEDURE — 86803 HEPATITIS C AB TEST: CPT

## 2024-11-02 PROCEDURE — 84443 ASSAY THYROID STIM HORMONE: CPT

## 2024-11-02 PROCEDURE — 80061 LIPID PANEL: CPT

## 2024-11-02 PROCEDURE — 87389 HIV-1 AG W/HIV-1&-2 AB AG IA: CPT

## 2024-11-02 PROCEDURE — 36415 COLL VENOUS BLD VENIPUNCTURE: CPT

## 2024-11-02 PROCEDURE — 84153 ASSAY OF PSA TOTAL: CPT

## 2024-11-02 PROCEDURE — 85025 COMPLETE CBC W/AUTO DIFF WBC: CPT

## 2024-11-02 PROCEDURE — 83036 HEMOGLOBIN GLYCOSYLATED A1C: CPT

## 2024-11-02 PROCEDURE — 82306 VITAMIN D 25 HYDROXY: CPT

## 2024-11-02 PROCEDURE — 80053 COMPREHEN METABOLIC PANEL: CPT

## 2024-11-04 ENCOUNTER — OFFICE VISIT (OUTPATIENT)
Dept: FAMILY MEDICINE | Facility: CLINIC | Age: 64
End: 2024-11-04
Payer: MEDICARE

## 2024-11-04 VITALS
HEART RATE: 89 BPM | BODY MASS INDEX: 23.47 KG/M2 | HEIGHT: 65 IN | WEIGHT: 140.88 LBS | DIASTOLIC BLOOD PRESSURE: 64 MMHG | TEMPERATURE: 98 F | OXYGEN SATURATION: 99 % | SYSTOLIC BLOOD PRESSURE: 108 MMHG

## 2024-11-04 DIAGNOSIS — Z00.00 WELLNESS EXAMINATION: Primary | ICD-10-CM

## 2024-11-04 DIAGNOSIS — R76.8 POSITIVE HEPATITIS C ANTIBODY TEST: ICD-10-CM

## 2024-11-04 DIAGNOSIS — M54.42 CHRONIC LEFT-SIDED LOW BACK PAIN WITH LEFT-SIDED SCIATICA: ICD-10-CM

## 2024-11-04 DIAGNOSIS — G89.29 CHRONIC LEFT-SIDED LOW BACK PAIN WITH LEFT-SIDED SCIATICA: ICD-10-CM

## 2024-11-04 LAB
HCV AB SERPL QL IA: REACTIVE
HIV 1+2 AB+HIV1 P24 AG SERPL QL IA: NONREACTIVE

## 2024-11-04 PROCEDURE — 3044F HG A1C LEVEL LT 7.0%: CPT | Mod: ,,, | Performed by: NURSE PRACTITIONER

## 2024-11-04 PROCEDURE — 3078F DIAST BP <80 MM HG: CPT | Mod: ,,, | Performed by: NURSE PRACTITIONER

## 2024-11-04 PROCEDURE — 3008F BODY MASS INDEX DOCD: CPT | Mod: ,,, | Performed by: NURSE PRACTITIONER

## 2024-11-04 PROCEDURE — 4010F ACE/ARB THERAPY RXD/TAKEN: CPT | Mod: ,,, | Performed by: NURSE PRACTITIONER

## 2024-11-04 PROCEDURE — 3074F SYST BP LT 130 MM HG: CPT | Mod: ,,, | Performed by: NURSE PRACTITIONER

## 2024-11-04 PROCEDURE — 99213 OFFICE O/P EST LOW 20 MIN: CPT | Mod: ,,, | Performed by: NURSE PRACTITIONER

## 2024-11-04 PROCEDURE — 1159F MED LIST DOCD IN RCRD: CPT | Mod: ,,, | Performed by: NURSE PRACTITIONER

## 2024-11-04 RX ORDER — HYDROCODONE BITARTRATE AND ACETAMINOPHEN 7.5; 325 MG/1; MG/1
1 TABLET ORAL EVERY 6 HOURS PRN
Qty: 12 TABLET | Refills: 0 | Status: CANCELLED | OUTPATIENT
Start: 2024-11-04

## 2024-11-04 RX ORDER — METHOCARBAMOL 500 MG/1
500 TABLET, FILM COATED ORAL 2 TIMES DAILY
Qty: 60 TABLET | Refills: 1 | Status: SHIPPED | OUTPATIENT
Start: 2024-11-04

## 2024-11-04 RX ORDER — DULOXETIN HYDROCHLORIDE 30 MG/1
30 CAPSULE, DELAYED RELEASE ORAL DAILY
Qty: 30 CAPSULE | Refills: 11 | Status: CANCELLED | OUTPATIENT
Start: 2024-11-04 | End: 2025-11-04

## 2024-11-04 RX ORDER — IBUPROFEN 800 MG/1
800 TABLET ORAL EVERY 6 HOURS PRN
Qty: 20 TABLET | Refills: 0 | Status: SHIPPED | OUTPATIENT
Start: 2024-11-04

## 2024-11-04 NOTE — PROGRESS NOTES
Patient ID: [id number]     Chief Complaint: wellness with labs (No concerns , )      HPI:     Teo Santiago is a 63 y.o. male here today for a Medicare Wellness.      Patient has past medical history of hypertension, seasonal allergies, chronic back pain, and depression. Diet described as on healthy. Stays active throughout the day. Wears dentures. Sleep quality described as good. Current smoker 1/2 PPD for the past 40 years).     Lab work noticed to have a reactive hepatitis-C antibody.  Patient reports no previous treatment or wearing of exposure to hepatitis-C.  -------------------------------------    BPH (benign prostatic hyperplasia)    COPD (chronic obstructive pulmonary disease)    GERD (gastroesophageal reflux disease)    Hypertension    Rheumatoid arthritis, unspecified        Past Surgical History:   Procedure Laterality Date    SPINE SURGERY  06/09/2020    METRX DLL LEFT L4/5, 06/09/20, [facility], DR. PONCE.    TONSILLECTOMY         Review of patient's allergies indicates:  No Known Allergies    Outpatient Medications Marked as Taking for the 11/4/24 encounter (Office Visit) with Geraldine Caicedo FNP   Medication Sig Dispense Refill    albuterol (PROVENTIL HFA) 90 mcg/actuation inhaler Inhale 2 puffs into the lungs every 6 (six) hours as needed for Wheezing. Rescue 18 g 0    cetirizine (ZYRTEC) 10 MG tablet Take 1 tablet (10 mg total) by mouth once daily. 30 tablet 6    GEMTESA 75 mg Tab Take 1 tablet by mouth.      lisinopriL 10 MG tablet Take one tablet by mouth once daily 30 tablet 3    [DISCONTINUED] ibuprofen (ADVIL,MOTRIN) 800 MG tablet Take 1 tablet (800 mg total) by mouth every 6 (six) hours as needed for Pain. 20 tablet 0       Social History     Socioeconomic History    Marital status:    Tobacco Use    Smoking status: Every Day     Current packs/day: 1.00     Average packs/day: 1 pack/day for 38.0 years (38.0 ttl pk-yrs)     Types: Cigarettes    Smokeless tobacco: Never   Substance and  "Sexual Activity    Alcohol use: Yes     Alcohol/week: 6.0 standard drinks of alcohol     Types: 6 Cans of beer per week     Comment: socially    Drug use: Not Currently     Types: Cocaine, Methamphetamines    Sexual activity: Yes        No family history on file.     Patient Care Team:  Geraldine Caicedo FNP as PCP - General (Family Medicine)     Subjective:   ROS      Objective:   /64   Pulse 89   Temp 97.9 °F (36.6 °C)   Ht 5' 5" (1.651 m)   Wt 63.9 kg (140 lb 14.4 oz)   SpO2 99%   BMI 23.45 kg/m²     Physical Exam    A comprehensive HEALTH RISK ASSESSMENT was completed today. Results are summarized below:    There are NO EMOTIONAL/SOCIAL CONCERNS identified on today's screening for Social Isolation, Depression and Anxiety.    There are NO COGNITIVE FUNCTION CONCERNS identified on today's screening.  The following FUNCTIONAL AND/OR SAFETY CONCERNS were identified on today's screening for Physical Symptoms, Nutritional, Home Safety/Living Situation, Fall Risk, Activities of Daily Living, Independent Activities of Daily Living, Physical Activity,Timed Up and Go test and Whisper test::  *Patient reports PHYSICAL ACTIVITY LIMITED BY PAIN/FATIGUE. ( )      The patient reports NO OPIOID PRESCRIPTIONS. This was confirmed through medication reconciliation and the  website.    The patient is A CURRENT TOBACCO USER.  Tobacco Use: High Risk (11/4/2024)    Patient History     Smoking Tobacco Use: Every Day     Smokeless Tobacco Use: Never     Passive Exposure: Not on file           All Questions regarding food, transportation or housing were not answered today.      Assessment/Plan:       ICD-10-CM ICD-9-CM   1. Wellness examination  Z00.00 V70.0   2. Chronic left-sided low back pain with left-sided sciatica  M54.42 724.2    G89.29 724.3     338.29   3. Positive hepatitis C antibody test  R76.8 795.79     1. Wellness examination  Discussed labs and preventative screenings   Overall health status reviewed.  "   Significant chronic conditions addressed, including ongoing treatment plans.   Good health habits reinforced.    Cardiovascular disease risk factors discussed.   Appropriate recommendations and preventative care medical   information provided with annual wellness exams encouraged.  Vaccination status     2. Chronic left-sided low back pain with left-sided sciatica  -     methocarbamoL (ROBAXIN) 500 MG Tab; Take 1 tablet (500 mg total) by mouth 2 (two) times daily.  Dispense: 60 tablet; Refill: 1  -     ibuprofen (ADVIL,MOTRIN) 800 MG tablet; Take 1 tablet (800 mg total) by mouth every 6 (six) hours as needed for Pain.  Dispense: 20 tablet; Refill: 0    3. Positive hepatitis C antibody test  -     Cancel: Hepatitis C Viral(HCV) RNA, Quant Real-Time PCR w/Reflexs; Future; Expected date: 11/05/2024  -     Ambulatory referral/consult to Infectious Disease; Future; Expected date: 11/18/2024         Medicare Annual Wellness and Personalized Prevention Plan:   Fall Risk + Home Safety + Hearing Impairment + Depression Screen + Opioid and Substance Abuse Screening + Cognitive Impairment Screen + Health Risk Assessment all reviewed.     Health Maintenance Topics with due status: Not Due       Topic Last Completion Date    Colorectal Cancer Screening 10/04/2021    TETANUS VACCINE 11/07/2023    LDCT Lung Screen 12/06/2023    Lipid Panel 11/02/2024      The patient's Health Maintenance was reviewed and the following appears to be due at this time:   Health Maintenance Due   Topic Date Due    Shingles Vaccine (1 of 2) Never done    RSV Vaccine (Age 60+ and Pregnant patients) (1 - Risk 60-74 years 1-dose series) Never done    Influenza Vaccine (1) Never done    COVID-19 Vaccine (6 - 2024-25 season) 09/01/2024       Advance Care Planning   I attest to discussing Advance Care Planning with patient and/or family member.  Education was provided including the importance of the Health Care Power of , Advance Directives, and/or  [place] documentation.  The patient expressed understanding to the importance of this information and discussion.       Future Appointments   Date Time Provider Department Center   3/7/2025  9:15 AM Geraldine Caicedo FNP [facility]   11/6/2025  9:00 AM Geraldine Caicedo [facility]     Follow up in about 4 months (around 3/4/2025), or if symptoms worsen or fail to improve. In addition to their scheduled follow up, the patient has also been instructed to follow up on as needed basis.

## 2024-11-05 ENCOUNTER — TELEPHONE (OUTPATIENT)
Dept: FAMILY MEDICINE | Facility: CLINIC | Age: 64
End: 2024-11-05
Payer: MEDICARE

## 2024-11-05 NOTE — TELEPHONE ENCOUNTER
----- Message from Thi sent at 11/5/2024  1:18 PM CST -----  Who Called: Teo R Bergeron    Caller is requesting assistance/information from provider's office.    Symptoms (please be specific):    How long has patient had these symptoms:    List of preferred pharmacies on file (remove unneeded):       Preferred Method of Contact: Phone Call  Patient's Preferred Phone Number on File: [phone]   Best Call Back Number, if different:  Additional Information: Pt would like to know  more information about hepatitis. Please advise.

## 2024-11-07 ENCOUNTER — LAB VISIT (OUTPATIENT)
Dept: LAB | Facility: HOSPITAL | Age: 64
End: 2024-11-07
Attending: NURSE PRACTITIONER
Payer: MEDICARE

## 2024-11-07 DIAGNOSIS — Z00.00 WELLNESS EXAMINATION: Primary | ICD-10-CM

## 2024-11-07 PROCEDURE — 87522 HEPATITIS C REVRS TRNSCRPJ: CPT

## 2024-11-08 ENCOUNTER — NURSE TRIAGE (OUTPATIENT)
Dept: ADMINISTRATIVE | Facility: CLINIC | Age: 64
End: 2024-11-08
Payer: MEDICARE

## 2024-11-08 NOTE — TELEPHONE ENCOUNTER
Pt calling concerned about lab results, reports that he was told that he has Hepatitis C    Wife is bedridden; wants to make sure he does not spread to her or grandchildren.     Denies current symptoms    I advised pt that he needs to discuss lab results as well as information about lab results with provider.     Offered to send a direct message to provider to have them reach out to him to discuss further. Pt became very upset with triager, began cursing on line. Reports that we are not helping him. Offered again to send direct message to provider. Pt became upset with triager again. Did still send urgent message to provider to follow up with pt.     Reason for Disposition   [1] Follow-up call from patient regarding patient's clinical status AND [2] information NON-URGENT    Protocols used: PCP Call - No Triage-A-

## 2024-11-11 ENCOUNTER — TELEPHONE (OUTPATIENT)
Dept: FAMILY MEDICINE | Facility: CLINIC | Age: 64
End: 2024-11-11
Payer: MEDICARE

## 2024-11-11 LAB — MAYO GENERIC ORDERABLE RESULT: ABNORMAL

## 2024-11-11 NOTE — TELEPHONE ENCOUNTER
Reported to patient HCV RNA Quant elevated, also discussed Hep C virus and transmission of virus in detail.  Instructed referral sent to infectious disease MD.  Patient to expect call within next two weeks for an appt, if has not heard back please notify office to avoid delay in care.  Voices understanding and agrees.

## 2024-11-11 NOTE — TELEPHONE ENCOUNTER
----- Message from ANDREW Lowe sent at 11/11/2024  4:17 PM CST -----  Please inform patient referral sent to ID for further workup of new Hep C dx .      Please refer to previous message about explain to the patient and transmission virus  All other results within acceptable ranges.

## 2024-11-11 NOTE — TELEPHONE ENCOUNTER
Patient was informed by hayley that test could take 5-10 days before results where back to result.

## 2024-11-11 NOTE — TELEPHONE ENCOUNTER
----- Message from Thi sent at 11/8/2024 10:09 AM CST -----  Who Called: Teo R Bergeron    Caller is requesting assistance/information from provider's office.    Symptoms (please be specific):    How long has patient had these symptoms:    List of preferred pharmacies on file (remove unneeded):   Preferred Method of Contact: Phone Call  Patient's Preferred Phone Number on File: [phone]   Best Call Back Number, if different:  Additional Information: Pt calling for results from blood draw on 11/7/24. Please advise.

## 2024-11-11 NOTE — PROGRESS NOTES
Please inform patient referral sent to ID for further workup of new Hep C dx .      Please refer to previous message about explain to the patient and transmission virus  All other results within acceptable ranges.

## 2024-11-19 ENCOUNTER — TELEPHONE (OUTPATIENT)
Dept: INFECTIOUS DISEASES | Facility: CLINIC | Age: 64
End: 2024-11-19
Payer: MEDICARE

## 2024-11-19 DIAGNOSIS — B18.2 CHRONIC HEPATITIS C WITHOUT HEPATIC COMA: Primary | ICD-10-CM

## 2024-11-19 NOTE — TELEPHONE ENCOUNTER
----- Message from Rachelle sent at 11/19/2024  9:42 AM CST -----  New Patient of Noemi Smith    Patient scheduled for 1/9/24 for appt at 10:30    Fibro Scan 1/9/24 at 8:00    [phone]  9:44  Thanks,

## 2024-11-25 DIAGNOSIS — G89.29 CHRONIC LEFT-SIDED LOW BACK PAIN WITH LEFT-SIDED SCIATICA: ICD-10-CM

## 2024-11-25 DIAGNOSIS — M54.42 CHRONIC LEFT-SIDED LOW BACK PAIN WITH LEFT-SIDED SCIATICA: ICD-10-CM

## 2024-11-25 RX ORDER — GABAPENTIN 300 MG/1
CAPSULE ORAL
Qty: 270 CAPSULE | Refills: 1 | Status: SHIPPED | OUTPATIENT
Start: 2024-11-25

## 2024-11-27 ENCOUNTER — HOSPITAL ENCOUNTER (OUTPATIENT)
Dept: RADIOLOGY | Facility: HOSPITAL | Age: 64
Discharge: HOME | End: 2024-11-27
Attending: NURSE PRACTITIONER
Payer: MEDICARE

## 2024-11-27 DIAGNOSIS — B18.2 CHRONIC HEPATITIS C WITHOUT HEPATIC COMA: ICD-10-CM

## 2024-11-27 PROCEDURE — 76705 ECHO EXAM OF ABDOMEN: CPT | Mod: TC

## 2024-12-02 ENCOUNTER — TELEPHONE (OUTPATIENT)
Dept: INFECTIOUS DISEASES | Facility: CLINIC | Age: 64
End: 2024-12-02
Payer: MEDICARE

## 2024-12-02 ENCOUNTER — TELEPHONE (OUTPATIENT)
Dept: FAMILY MEDICINE | Facility: CLINIC | Age: 64
End: 2024-12-02
Payer: MEDICARE

## 2024-12-02 DIAGNOSIS — Z12.2 ENCOUNTER FOR SCREENING FOR LUNG CANCER: Primary | ICD-10-CM

## 2024-12-02 NOTE — TELEPHONE ENCOUNTER
----- Message from ANDREW Mcgill sent at 12/2/2024 11:06 AM CST -----  Reviewed US from 11/27/24 which shows hepatic steatosis. Please contact patient with the following recommendations:   Goal BMI < 25.   Limit consumption of high fat foods, high sugar foods, and salt.   Avoid alcohol and illicit drug use.  Increase exercise activity; 30 minutes of strenuous activity 3-5 x week.

## 2024-12-06 ENCOUNTER — TELEPHONE (OUTPATIENT)
Dept: INFECTIOUS DISEASES | Facility: CLINIC | Age: 64
End: 2024-12-06
Payer: MEDICARE

## 2024-12-06 ENCOUNTER — HOSPITAL ENCOUNTER (OUTPATIENT)
Dept: RADIOLOGY | Facility: HOSPITAL | Age: 64
Discharge: HOME | End: 2024-12-06
Attending: NURSE PRACTITIONER
Payer: MEDICARE

## 2024-12-06 DIAGNOSIS — Z87.891 PERSONAL HISTORY OF TOBACCO USE, PRESENTING HAZARDS TO HEALTH: ICD-10-CM

## 2024-12-06 PROCEDURE — 71271 CT THORAX LUNG CANCER SCR C-: CPT | Mod: TC

## 2025-01-09 ENCOUNTER — OFFICE VISIT (OUTPATIENT)
Dept: INFECTIOUS DISEASES | Facility: CLINIC | Age: 65
End: 2025-01-09
Payer: MEDICARE

## 2025-01-09 VITALS
BODY MASS INDEX: 24.48 KG/M2 | DIASTOLIC BLOOD PRESSURE: 74 MMHG | HEIGHT: 65 IN | RESPIRATION RATE: 16 BRPM | HEART RATE: 70 BPM | TEMPERATURE: 98 F | WEIGHT: 146.94 LBS | SYSTOLIC BLOOD PRESSURE: 126 MMHG

## 2025-01-09 DIAGNOSIS — K76.0 HEPATIC STEATOSIS: ICD-10-CM

## 2025-01-09 DIAGNOSIS — B18.2 CHRONIC HEPATITIS C WITHOUT HEPATIC COMA: Primary | ICD-10-CM

## 2025-01-09 LAB
ALBUMIN SERPL-MCNC: 4.2 G/DL (ref 3.4–4.8)
ALBUMIN/GLOB SERPL: 1 RATIO (ref 1.1–2)
ALP SERPL-CCNC: 115 UNIT/L (ref 40–150)
ALT SERPL-CCNC: 25 UNIT/L (ref 0–55)
ANION GAP SERPL CALC-SCNC: 4 MEQ/L
AST SERPL-CCNC: 21 UNIT/L (ref 5–34)
BASOPHILS # BLD AUTO: 0.04 X10(3)/MCL
BASOPHILS NFR BLD AUTO: 0.6 %
BILIRUB SERPL-MCNC: 0.4 MG/DL
BUN SERPL-MCNC: 12.2 MG/DL (ref 8.4–25.7)
CALCIUM SERPL-MCNC: 9.8 MG/DL (ref 8.8–10)
CHLORIDE SERPL-SCNC: 104 MMOL/L (ref 98–107)
CO2 SERPL-SCNC: 28 MMOL/L (ref 23–31)
CREAT SERPL-MCNC: 0.85 MG/DL (ref 0.72–1.25)
CREAT/UREA NIT SERPL: 14
EOSINOPHIL # BLD AUTO: 0.9 X10(3)/MCL (ref 0–0.9)
EOSINOPHIL NFR BLD AUTO: 13.8 %
ERYTHROCYTE [DISTWIDTH] IN BLOOD BY AUTOMATED COUNT: 11.9 % (ref 11.5–17)
GFR SERPLBLD CREATININE-BSD FMLA CKD-EPI: >60 ML/MIN/1.73/M2
GLOBULIN SER-MCNC: 4.2 GM/DL (ref 2.4–3.5)
GLUCOSE SERPL-MCNC: 86 MG/DL (ref 82–115)
HAV AB SER QL IA: REACTIVE
HBV SURFACE AB SER-ACNC: 6.12 MIU/ML
HBV SURFACE AB SERPL IA-ACNC: NONREACTIVE M[IU]/ML
HCT VFR BLD AUTO: 46.7 % (ref 42–52)
HGB BLD-MCNC: 16.2 G/DL (ref 14–18)
IMM GRANULOCYTES # BLD AUTO: 0.02 X10(3)/MCL (ref 0–0.04)
IMM GRANULOCYTES NFR BLD AUTO: 0.3 %
INR PPP: 1
LYMPHOCYTES # BLD AUTO: 2.23 X10(3)/MCL (ref 0.6–4.6)
LYMPHOCYTES NFR BLD AUTO: 34.1 %
MCH RBC QN AUTO: 34.2 PG (ref 27–31)
MCHC RBC AUTO-ENTMCNC: 34.7 G/DL (ref 33–36)
MCV RBC AUTO: 98.7 FL (ref 80–94)
MONOCYTES # BLD AUTO: 0.5 X10(3)/MCL (ref 0.1–1.3)
MONOCYTES NFR BLD AUTO: 7.6 %
NEUTROPHILS # BLD AUTO: 2.85 X10(3)/MCL (ref 2.1–9.2)
NEUTROPHILS NFR BLD AUTO: 43.6 %
NRBC BLD AUTO-RTO: 0 %
PLATELET # BLD AUTO: 246 X10(3)/MCL (ref 130–400)
PMV BLD AUTO: 9.3 FL (ref 7.4–10.4)
POTASSIUM SERPL-SCNC: 4 MMOL/L (ref 3.5–5.1)
PROT SERPL-MCNC: 8.4 GM/DL (ref 5.8–7.6)
PROTHROMBIN TIME: 13.1 SECONDS (ref 11.4–14)
RBC # BLD AUTO: 4.73 X10(6)/MCL (ref 4.7–6.1)
SODIUM SERPL-SCNC: 136 MMOL/L (ref 136–145)
T PALLIDUM AB SER QL: NONREACTIVE
WBC # BLD AUTO: 6.54 X10(3)/MCL (ref 4.5–11.5)

## 2025-01-09 PROCEDURE — 0003M LIVER DIS 10 ASSAYS W/NASH: CPT | Performed by: NURSE PRACTITIONER

## 2025-01-09 PROCEDURE — 86780 TREPONEMA PALLIDUM: CPT | Performed by: NURSE PRACTITIONER

## 2025-01-09 PROCEDURE — 86706 HEP B SURFACE ANTIBODY: CPT | Performed by: NURSE PRACTITIONER

## 2025-01-09 PROCEDURE — 36415 COLL VENOUS BLD VENIPUNCTURE: CPT | Performed by: NURSE PRACTITIONER

## 2025-01-09 PROCEDURE — 86039 ANTINUCLEAR ANTIBODIES (ANA): CPT | Performed by: NURSE PRACTITIONER

## 2025-01-09 PROCEDURE — 87522 HEPATITIS C REVRS TRNSCRPJ: CPT | Performed by: NURSE PRACTITIONER

## 2025-01-09 PROCEDURE — 99214 OFFICE O/P EST MOD 30 MIN: CPT | Mod: PBBFAC | Performed by: NURSE PRACTITIONER

## 2025-01-09 PROCEDURE — 86708 HEPATITIS A ANTIBODY: CPT | Performed by: NURSE PRACTITIONER

## 2025-01-09 PROCEDURE — 85025 COMPLETE CBC W/AUTO DIFF WBC: CPT | Performed by: NURSE PRACTITIONER

## 2025-01-09 PROCEDURE — 87902 NFCT AGT GNTYP ALYS HEP C: CPT | Performed by: NURSE PRACTITIONER

## 2025-01-09 PROCEDURE — 87591 N.GONORRHOEAE DNA AMP PROB: CPT | Performed by: NURSE PRACTITIONER

## 2025-01-09 PROCEDURE — 80053 COMPREHEN METABOLIC PANEL: CPT | Performed by: NURSE PRACTITIONER

## 2025-01-09 PROCEDURE — 81596 NFCT DS CHRNC HCV 6 ASSAYS: CPT | Performed by: NURSE PRACTITIONER

## 2025-01-09 PROCEDURE — 85610 PROTHROMBIN TIME: CPT | Performed by: NURSE PRACTITIONER

## 2025-01-09 NOTE — PROGRESS NOTES
Patient ID: Teo Santiago 64 y.o.     Chief Complaint:   Chief Complaint   Patient presents with    Referral     Hep C        HPI:    Teo Santiago is a 65 y/o WM here for initial Hep C visit.  Dx 11/11/24 and referred to clinic by ANDREW Lowe.  Past hx of IVDU 40 years ago and two professional tattoos about 25 years ago. Pt denies blood transfusion. States he drinks beer occasionally.  Denies drug use. Pt is . He states he is pretty sure his wife is negative, because she has had cancer with a full workup. Pt denies fever, headache, chills, visual problems, icterus, jaundice, N/V/D, esophageal varices, SOB, cough, abd pain, ascites or kiran colored stools. Co-morbidities include HTN and BPH.  Reviewed referring labs from 11/2/24 Hep C ab positive, Hep C VL 8910, HIV neg.  Pt will need updated labs today. US done 11/19/24 shows hepatic steatosis. Contour is normal.  No focal liver lesions. FibroScan is scheduled for 2/14/25.  Vaccines recommended.PT is not amenable. Pt smokes 1 ppd of cigarettes.              Past Medical History:   Diagnosis Date    BPH (benign prostatic hyperplasia)     COPD (chronic obstructive pulmonary disease)     GERD (gastroesophageal reflux disease)     Hypertension     Rheumatoid arthritis, unspecified         Past Surgical History:   Procedure Laterality Date    SPINE SURGERY  06/09/2020    METRX DLL LEFT L4/5, 06/09/20, DR. ROSARIO KELLEY.    TONSILLECTOMY          Social History     Socioeconomic History    Marital status:    Tobacco Use    Smoking status: Every Day     Current packs/day: 1.00     Average packs/day: 1 pack/day for 38.0 years (38.0 ttl pk-yrs)     Types: Cigarettes    Smokeless tobacco: Never   Substance and Sexual Activity    Alcohol use: Yes     Alcohol/week: 6.0 standard drinks of alcohol     Types: 6 Cans of beer per week     Comment: socially    Drug use: Not Currently     Types: Cocaine, Methamphetamines    Sexual activity: Yes     Partners:  "Female        Family History   Problem Relation Name Age of Onset    Diabetes Mother      ALS Father      Cancer Sister          Review of patient's allergies indicates:  No Known Allergies     Immunization History   Administered Date(s) Administered    COVID-19 Vaccine 03/03/2021, 03/31/2021, 12/21/2021    COVID-19, MRNA, LN-S, PF (MODERNA FULL 0.5 ML DOSE) 03/03/2021, 03/31/2021, 12/21/2021    Tdap 07/28/2021, 11/07/2023        Review of Systems   Constitutional: Negative.    HENT: Negative.     Eyes: Negative.    Respiratory: Negative.     Cardiovascular: Negative.    Gastrointestinal: Negative.    Genitourinary: Negative.    Musculoskeletal: Negative.    Skin: Negative.    Neurological: Negative.    Endo/Heme/Allergies: Negative.    Psychiatric/Behavioral: Negative.     All other systems reviewed and are negative.         Objective:      /74   Pulse 70   Temp 97.9 °F (36.6 °C) (Oral)   Resp 16   Ht 5' 5" (1.651 m)   Wt 66.7 kg (146 lb 15 oz)   BMI 24.45 kg/m²      Physical Exam  Vitals reviewed.   Constitutional:       General: He is not in acute distress.     Appearance: Normal appearance.   HENT:      Head: Normocephalic.      Right Ear: External ear normal.      Left Ear: External ear normal.      Nose: Nose normal.      Mouth/Throat:      Mouth: Mucous membranes are moist.      Pharynx: Oropharynx is clear.   Eyes:      General: No scleral icterus.     Extraocular Movements: Extraocular movements intact.      Conjunctiva/sclera: Conjunctivae normal.      Pupils: Pupils are equal, round, and reactive to light.   Cardiovascular:      Rate and Rhythm: Normal rate and regular rhythm.      Pulses: Normal pulses.      Heart sounds: Normal heart sounds.   Pulmonary:      Effort: Pulmonary effort is normal. No respiratory distress.      Breath sounds: Normal breath sounds.   Abdominal:      General: Bowel sounds are normal. There is no distension.      Palpations: Abdomen is soft. There is no mass.      " Tenderness: There is no abdominal tenderness. There is no right CVA tenderness or left CVA tenderness.      Hernia: No hernia is present.   Musculoskeletal:         General: No tenderness or signs of injury. Normal range of motion.      Cervical back: Normal range of motion and neck supple.      Right lower leg: No edema.      Left lower leg: No edema.   Lymphadenopathy:      Cervical: No cervical adenopathy.   Skin:     General: Skin is warm and dry.      Capillary Refill: Capillary refill takes less than 2 seconds.      Findings: No erythema or lesion.   Neurological:      General: No focal deficit present.      Mental Status: He is alert and oriented to person, place, and time. Mental status is at baseline.   Psychiatric:         Mood and Affect: Mood normal.         Behavior: Behavior normal.         Thought Content: Thought content normal.         Judgment: Judgment normal.          Labs:   Lab Results   Component Value Date    WBC 6.24 11/02/2024    HGB 16.5 11/02/2024    HCT 48.9 11/02/2024    .2 (H) 11/02/2024     11/02/2024       CMP  Sodium   Date Value Ref Range Status   11/02/2024 142 136 - 145 mmol/L Final     Potassium   Date Value Ref Range Status   11/02/2024 4.6 3.5 - 5.1 mmol/L Final     Chloride   Date Value Ref Range Status   11/02/2024 110 (H) 98 - 107 mmol/L Final     CO2   Date Value Ref Range Status   11/02/2024 26 23 - 31 mmol/L Final     Blood Urea Nitrogen   Date Value Ref Range Status   11/02/2024 11.7 8.4 - 25.7 mg/dL Final     Creatinine   Date Value Ref Range Status   11/02/2024 1.08 0.72 - 1.25 mg/dL Final     Calcium   Date Value Ref Range Status   11/02/2024 9.9 8.8 - 10.0 mg/dL Final     Albumin   Date Value Ref Range Status   11/02/2024 4.0 3.4 - 4.8 g/dL Final     Bilirubin Total   Date Value Ref Range Status   11/02/2024 0.6 <=1.5 mg/dL Final     ALP   Date Value Ref Range Status   11/02/2024 105 40 - 150 unit/L Final     AST   Date Value Ref Range Status    11/02/2024 23 5 - 34 unit/L Final     ALT   Date Value Ref Range Status   11/02/2024 30 0 - 55 unit/L Final     eGFR   Date Value Ref Range Status   11/02/2024 >60 mL/min/1.73/m2 Final     Lab Results   Component Value Date    TSH 2.150 11/02/2024     HIV   Date Value Ref Range Status   11/02/2024 Nonreactive Nonreactive Final       Imaging: Reviewed most recent relevant imaging studies available, notable results highlighted in this note      Medications:     Current Outpatient Medications   Medication Instructions    albuterol (PROVENTIL HFA) 90 mcg/actuation inhaler 2 puffs, Inhalation, Every 6 hours PRN, Rescue    cetirizine (ZYRTEC) 10 mg, Oral, Daily    gabapentin (NEURONTIN) 300 MG capsule take one capsule 3 times daily    GEMTESA 75 mg Tab 1 tablet    ibuprofen (ADVIL,MOTRIN) 800 mg, Oral, Every 6 hours PRN    lisinopriL 10 mg, Oral       Assessment:       Problem List Items Addressed This Visit    None  Visit Diagnoses       Chronic hepatitis C without hepatic coma    -  Primary    Relevant Orders    Hepatitis C Genotype    LAUREN IgG by IFA    CBC Auto Differential    Comprehensive Metabolic Panel    Fibrotest-Actitest, Serum    Hepatitis A antibody, IgG    Hepatitis B Surface Ab, Qualitative    Protime-INR    SYPHILIS ANTIBODY (WITH REFLEX RPR)    Sexually-Transmitted Infections (STIs) Increased Risk Panel    Hepatic steatosis        Relevant Orders    PRICE Fibrosure               Plan:      Chronic hepatitis C without hepatic coma  -     Ambulatory referral/consult to Infectious Disease  -     Hepatitis C Genotype; Future; Expected date: 01/09/2025  -     LAUREN IgG by IFA; Future; Expected date: 01/09/2025  -     CBC Auto Differential; Future; Expected date: 01/09/2025  -     Comprehensive Metabolic Panel; Future; Expected date: 01/09/2025  -     Fibrotest-Actitest, Serum; Future; Expected date: 01/09/2025  -     Hepatitis A antibody, IgG; Future; Expected date: 01/09/2025  -     Hepatitis B Surface Ab,  Qualitative; Future; Expected date: 01/09/2025  -     Protime-INR; Future; Expected date: 01/09/2025  -     SYPHILIS ANTIBODY (WITH REFLEX RPR); Future; Expected date: 01/09/2025  -     Sexually-Transmitted Infections (STIs) Increased Risk Panel; Future; Expected date: 01/09/2025  Diagnosed 11/11/24  Treatment naive  baseline VL 8910 on 11/2/24  Fibrosure: ordered   FibroScan scheduled for 2/14/25  RUQ abdominal u/s: 11/19/24  Hep C disease process and treatment plan discussed extensively with the patient  Pt encouraged to refrain from alcohol and illicit drug use.  Blood and sex precautions discussed. Do not share a needle, razor, nail clippers, toothbrush, or drug paraphernalia with anyone.  No Tylenol at doses greater than 2000 mg per day.    Labs today   Plan to start Epclusa 1 po daily x 12 weeks.   Refer to HCV  to initiate PA & treatment protocol.   RTC approximately 6 weeks with Noemi   Vaccines recommended     Hepatic steatosis  -     PRICE Fibrosure; Future; Expected date: 01/09/2025  Goal BMI < 25.   Limit consumption of high fat foods, high sugar foods, and salt.   Avoid alcohol and illicit drug use.  Increase exercise activity; 30 minutes of strenuous activity 3-5 x week.     Nicotine dependence   Spent approx 3 minutes discussing smoking cessation   Pt is not ready to quit.  Discussed benefits of quitting: improved overall health, decreased cardiac/vascular/pulmonary/stroke risks, as well as cost savings.       30 minutes of total time spent on the encounter, which includes face to face time and non-face to face time preparing to see the patient (eg, review of tests), Obtaining and/or reviewing separately obtained history, Documenting clinical information in the electronic or other health record, Independently interpreting results (not separately reported) and communicating results to the patient/family/caregiver, or Care coordination (not separately reported).

## 2025-01-10 LAB
C TRACH RRNA UR QL NAA+PROBE: NOT DETECTED
HCV QUANTITATIVE RESULT (OHS): ABNORMAL IU/ML
HCV RNA SERPL NAA+PROBE-LOG IU: DETECTED {LOG_IU}/ML
N GONORRHOEA DNA UR QL NAA+PROBE: NOT DETECTED
T VAGINALIS RRNA UR QL NAA+PROBE: NOT DETECTED

## 2025-01-11 DIAGNOSIS — M54.42 CHRONIC LEFT-SIDED LOW BACK PAIN WITH LEFT-SIDED SCIATICA: ICD-10-CM

## 2025-01-11 DIAGNOSIS — G89.29 CHRONIC LEFT-SIDED LOW BACK PAIN WITH LEFT-SIDED SCIATICA: ICD-10-CM

## 2025-01-13 DIAGNOSIS — M54.42 CHRONIC LEFT-SIDED LOW BACK PAIN WITH LEFT-SIDED SCIATICA: ICD-10-CM

## 2025-01-13 DIAGNOSIS — G89.29 CHRONIC LEFT-SIDED LOW BACK PAIN WITH LEFT-SIDED SCIATICA: ICD-10-CM

## 2025-01-13 RX ORDER — IBUPROFEN 800 MG/1
800 TABLET ORAL EVERY 6 HOURS PRN
Qty: 20 TABLET | Refills: 0 | Status: SHIPPED | OUTPATIENT
Start: 2025-01-13

## 2025-01-13 RX ORDER — IBUPROFEN 800 MG/1
800 TABLET ORAL EVERY 6 HOURS PRN
Qty: 20 TABLET | Refills: 0 | OUTPATIENT
Start: 2025-01-13

## 2025-01-14 ENCOUNTER — TELEPHONE (OUTPATIENT)
Dept: INFECTIOUS DISEASES | Facility: CLINIC | Age: 65
End: 2025-01-14
Payer: MEDICARE

## 2025-01-14 NOTE — TELEPHONE ENCOUNTER
----- Message from Nurse Casper sent at 1/14/2025 10:52 AM CST -----  Regarding: FW: RX Question    ----- Message -----  From: Christin Lundberg  Sent: 1/14/2025  10:43 AM CST  To: #  Subject: RX Question                                      Patient of Noemi    Patient called in requesting a call back about some medication that he waiting on to be filled at the pharmacy.    Call  back# [phone]    10:42 am  Thank

## 2025-01-14 NOTE — TELEPHONE ENCOUNTER
----- Message from Rachelle sent at 1/14/2025  2:19 PM CST -----  Patient of Noemi    Patient called regarding medication for Hep C.  He stated he did not receive.     [phone]  2:20  Thanks,

## 2025-01-14 NOTE — TELEPHONE ENCOUNTER
Phoned patient at , left voice message explaining that I do not have all of his lab results back from the 9th, and Ms. Franklin will  not write the prescription until she has all the necessary lab results and also informed of the process for filling the prescription once it is written.  After disconnecting the call received notice from the PARs that patient was on the phone.   Spoke with patient and informed him of the above he states that is not what he was told at his appointment, reviewed the clinic note and attempted to explain the process he states I don't need to hear this just call me when you have my medicine.

## 2025-01-15 ENCOUNTER — TELEPHONE (OUTPATIENT)
Dept: FAMILY MEDICINE | Facility: CLINIC | Age: 65
End: 2025-01-15
Payer: MEDICARE

## 2025-01-15 ENCOUNTER — TELEPHONE (OUTPATIENT)
Dept: INFECTIOUS DISEASES | Facility: CLINIC | Age: 65
End: 2025-01-15
Payer: MEDICARE

## 2025-01-15 DIAGNOSIS — R76.8 ANA POSITIVE: Primary | ICD-10-CM

## 2025-01-15 NOTE — TELEPHONE ENCOUNTER
Reported to patient LDCT within acceptable range/limits.  Will repeat in 12 months.  Voices understanding and agrees.

## 2025-01-15 NOTE — TELEPHONE ENCOUNTER
Reviewed labs. LAUREN is positive in the setting of cirrhosis. Pt will need some additional labs. I will place the order. Please notify the patient.

## 2025-01-15 NOTE — TELEPHONE ENCOUNTER
----- Message from ANDREW Lowe sent at 1/15/2025  1:55 PM CST -----  Please inform patient Benign Appearance or Behavior - continue annual screening with LDCT of Chest /LuNGS  in 12 months.      within acceptable ranges - no change in treatment required.

## 2025-01-16 ENCOUNTER — TELEPHONE (OUTPATIENT)
Dept: INFECTIOUS DISEASES | Facility: CLINIC | Age: 65
End: 2025-01-16
Payer: MEDICARE

## 2025-01-16 DIAGNOSIS — B18.2 CHRONIC HEPATITIS C WITHOUT HEPATIC COMA: Primary | ICD-10-CM

## 2025-01-16 DIAGNOSIS — J32.9 SINUSITIS, UNSPECIFIED CHRONICITY, UNSPECIFIED LOCATION: ICD-10-CM

## 2025-01-16 RX ORDER — VELPATASVIR AND SOFOSBUVIR 100; 400 MG/1; MG/1
1 TABLET, FILM COATED ORAL DAILY
Qty: 28 TABLET | Refills: 2 | Status: SHIPPED | OUTPATIENT
Start: 2025-01-16

## 2025-01-16 RX ORDER — ALBUTEROL SULFATE 90 UG/1
2 INHALANT RESPIRATORY (INHALATION) EVERY 6 HOURS PRN
Qty: 18 G | Refills: 0 | Status: SHIPPED | OUTPATIENT
Start: 2025-01-16 | End: 2026-01-16

## 2025-01-17 ENCOUNTER — LAB VISIT (OUTPATIENT)
Dept: LAB | Facility: HOSPITAL | Age: 65
End: 2025-01-17
Attending: NURSE PRACTITIONER
Payer: MEDICARE

## 2025-01-17 ENCOUNTER — TELEPHONE (OUTPATIENT)
Dept: INFECTIOUS DISEASES | Facility: CLINIC | Age: 65
End: 2025-01-17
Payer: MEDICARE

## 2025-01-17 DIAGNOSIS — R76.8 ANA POSITIVE: ICD-10-CM

## 2025-01-17 PROCEDURE — 86015 ACTIN ANTIBODY EACH: CPT

## 2025-01-17 PROCEDURE — 86376 MICROSOMAL ANTIBODY EACH: CPT

## 2025-01-17 PROCEDURE — 86381 MITOCHONDRIAL ANTIBODY EACH: CPT

## 2025-01-17 NOTE — TELEPHONE ENCOUNTER
----- Message from Christin sent at 1/17/2025  8:20 AM CST -----  Regarding: Pt Call back  New Patient of Noemi      Patient called into the clinic requesting a call back.  Patient stated he has a few questions for the nurse.    Patients call back number:  [phone]    08:00 am  Thanks

## 2025-01-17 NOTE — TELEPHONE ENCOUNTER
Spoke to Teo, he was concerned as to why he needed more labs. Went over Noemi's message with him and asked him to go to [place] to get his labs done.

## 2025-01-18 LAB
MITOCHONDRIA M2 AB SER IA-ACNC: <0.1 U
SMOOTH MUSCLE AB SER QL IF: NEGATIVE

## 2025-01-20 LAB — LKM-1 AB SER-ACNC: <5 U

## 2025-01-27 ENCOUNTER — TELEPHONE (OUTPATIENT)
Dept: INFECTIOUS DISEASES | Facility: CLINIC | Age: 65
End: 2025-01-27
Payer: MEDICARE

## 2025-01-27 NOTE — TELEPHONE ENCOUNTER
----- Message from Rachelle sent at 1/27/2025 11:31 AM CST -----  Patient of Noemi    Patient called regarding new meds  Disp Refills Start End JACQUELINE  sofosbuvir-velpatasvir 400-100 mg Tab.    Mg [facility]      Patient also wants to discuss labs.      11:32  Thanks,   Acute Care - Physical Therapy Treatment Note  [facility]     Patient Name: Sam Flores  : 1935  MRN: [id number]  Today's Date: 3/22/2018     Date of Referral to PT: 18  Referring Physician: Noah    Admit Date: 3/12/2018    Visit Dx:    ICD-10-CM ICD-9-CM   1. Dehydration E86.0 276.51   2. Renal insufficiency N28.9 593.9   3. Dementia with behavioral disturbance, unspecified dementia type F03.91 294.21   4. Impaired functional mobility, balance, gait, and endurance Z74.09 V49.89     Patient Active Problem List   Diagnosis   • Lumbar compression fracture   • Osteoporosis   • Dehydration   • Acute on chronic renal failure   • Type 2 diabetes mellitus without complication   • Benign essential hypertension   • Weight loss, abnormal   • Trouble swallowing   • Hyponatremia   • Hypokalemia   • Aspiration pneumonia       Therapy Treatment    Therapy Treatment / Health Promotion    Treatment Time/Intention  Discipline: physical therapy assistant (18 1100 : Gilberto Maravilla PTA)  Document Type: therapy note (daily note) (18 1100 : Gilberto Maravilla PTA)  Subjective Information: complains of, weakness, fatigue (18 1100 : Gilebrto Maravilla PTA)  Mode of Treatment: physical therapy (18 1100 : Gilberto Maravilla PTA)  Therapy Frequency (PT Clinical Impression): daily (18 1100 : Gilberto Maravilla PTA)  Patient Effort: good (18 1100 : Gilberto Maravilla PTA)  Existing Precautions/Restrictions: fall (18 1100 : Gilberto Maravilla PTA)  Plan of Care Review  Plan of Care Reviewed With: patient (18 1127 : Gilberto Maravilla PTA)    Vitals/Pain/Safety  Vital Signs  O2 Delivery Pre Treatment: room air (18 1100 : Gilberto Maravilla PTA)  Pain Assessment  Additional Documentation: Pain Scale: Numbers Pre/Post-Treatment (Group) (18 1100 : Gilberto Maravilla PTA)  Pain Scale: Numbers Pre/Post-Treatment  Pain Scale: Numbers, Pretreatment: 0/10 -  no pain (03/22/18 1100 : Gilberto Maravilla, PTA)  Positioning and Restraints  Pre-Treatment Position: in bed (03/22/18 1100 : Gilberto Maravilla, PTA)  Post Treatment Position: chair (03/22/18 1100 : Gilberto Maravilla, PTA)  In Chair: notified nsg, reclined, call light within reach, encouraged to call for assist, exit alarm on (03/22/18 1100 : Gilberto Maravilla PTA)    Mobility,ADL,Motor, Modality  Bed Mobility Assessment/Treatment  Bed Mobility Assessment/Treatment: bed mobility (all) activities (03/22/18 1100 : Gilberto Maravilla, PTA)  Trigg Level (Bed Mobility): contact guard assist (03/22/18 1100 : Gilberto Maravilla PTA)  Supine-Sit Trigg (Bed Mobility): contact guard (03/22/18 1100 : Gilberto Maravilla PTA)  Sit-Supine Trigg (Bed Mobility): not tested (03/22/18 1100 : Gilberto Maravilla PTA)  Comment (Bed Mobility): up to chair (03/22/18 1100 : Gilberto Maravilla, PTA)  Transfer Assessment/Treatment  Transfer Assessment/Treatment: sit-stand transfer, stand-sit transfer (03/22/18 1100 : Gilberto Maravilla PTA)  Sit-Stand Transfer  Sit-Stand Trigg (Transfers): contact guard (03/22/18 1100 : Gilberto Maravilla, PTA)  Assistive Device (Sit-Stand Transfers): walker, front-wheeled (03/22/18 1100 : Gilberto Maravilla PTA)  Stand-Sit Transfer  Stand-Sit Trigg (Transfers): contact guard (03/22/18 1100 : Gilberto Maravilla PTA)  Assistive Device (Stand-Sit Transfers): walker, front-wheeled (03/22/18 1100 : Gilberto Maravilla, PTA)  Gait/Stairs Assessment/Training  Gait/Stairs Assessment/Training: gait/ambulation assistive device (03/22/18 1100 : Gilberto Maravilla, PTA)  Trigg Level (Gait): contact guard (03/22/18 1100 : Gilberto Maravilla, PTA)  Assistive Device (Gait): walker, front-wheeled (03/22/18 1100 : Gilberto Maravilla, PTA)  Distance in Feet (Gait): 60 (03/22/18 1100 : Gilberto Maravilla, PTA)  Deviations/Abnormal Patterns (Gait): olvin decreased, stride  length decreased, base of support, narrow (03/22/18 1100 : Gilberto Maravilla, PTA)                 ROM/MMT             Sensory, Edema, Orthotics          Cognition, Communication, Swallow  Cognitive Assessment/Intervention- PT/OT  Orientation Status (Cognition): oriented to, person, place (03/22/18 1100 : Gilberto Maravilla, PTA)  Follows Commands (Cognition): follows one step commands, over 90% accuracy, delayed response/completion, increased processing time needed, verbal cues/prompting required (03/22/18 1100 : Gilberto Maravilla, PTA)  Personal Safety Interventions: fall prevention program maintained, gait belt, muscle strengthening facilitated, nonskid shoes/slippers when out of bed (03/22/18 1100 : Gilberto Maravilla, PTA)    Outcome Summary               PT Rehab Goals     Row Name 03/17/18 1300 03/13/18 1400          Bed Mobility Goal 1 (PT)    Activity/Assistive Device (Bed Mobility Goal 1, PT) bed mobility activities, all  -SI bed mobility activities, all  -MA     [place] Level/Cues Needed (Bed Mobility Goal 1, PT) independent  -MA independent  -MA     Time Frame (Bed Mobility Goal 1, PT) 1 week  -MA 1 week  -MA     Progress/Outcomes (Bed Mobility Goal 1, PT) goal ongoing  -MA goal ongoing  -MA        Transfer Goal 1 (PT)    Activity/Assistive Device (Transfer Goal 1, PT) transfers, all  -MA transfers, all  -MA     [place] Level/Cues Needed (Transfer Goal 1, PT) supervision required  -MA supervision required  -MA     Time Frame (Transfer Goal 1, PT) 1 week  -MA 1 week  -MA     Progress/Outcome (Transfer Goal 1, PT) goal ongoing  -MA goal ongoing  -MA        Gait Training Goal 1 (PT)    Westfall Level (Gait Training Goal 1, PT) supervision required  -MA supervision required  -MA     Distance (Gait Goal 1, PT) 35 feet progressing  -  -MA     Time Frame (Gait Training Goal 1, PT) 1 week  -MA 1 week  -MA     Progress/Outcome (Gait Training Goal 1, PT) goal ongoing  -MA goal ongoing  -MA        User Key  (r) = Recorded By, (t) = Taken By, (c) = Cosigned By    Initials Name Provider Type    MARYANN Chu, PTA Physical Therapy Assistant    MARBELLA Stout, PT Physical Therapist          Physical Therapy Education     Title: PT OT SLP Therapies (Active)     Topic: Physical Therapy (Active)     Point: Mobility training (Active)    Learning Progress Summary     Learner Status Readiness Method Response Comment Documented by    Patient Done Acceptance E,TB VU,DU  CW 03/22/18 1127     Active Acceptance E NR  MA 03/21/18 1145     Done Acceptance E NR,VU  EE 03/20/18 1632     Done Acceptance E,TB VU,DU  CW 03/19/18 1617     Active Acceptance E,TB NR positioning for comfort SI 03/17/18 1357     Active Acceptance E,TB,D NR   03/16/18 1643     Done Acceptance E,TB VU,DU  CW 03/15/18 1544     Done Acceptance E,TB VU,DU  CW 03/14/18 1502     Active Acceptance E NR  MA 03/13/18 1441    Family Active Acceptance E,TB NR positioning for comfort SI 03/17/18 1357          Point: Home exercise program (Active)    Learning Progress Summary     Learner Status Readiness Method Response Comment Documented by    Patient Done Acceptance E,TB VU,DU  CW 03/22/18 1127     Done Acceptance E,TB VU,DU  CW 03/19/18 1617     Active Acceptance E,TB NR positioning for comfort SI 03/17/18 1357     Done Acceptance E,TB VU,DU  CW 03/15/18 1544     Done Acceptance E,TB VU,DU  CW 03/14/18 1502    Family Active Acceptance E,TB NR positioning for comfort SI 03/17/18 1357          Point: Body mechanics (Active)    Learning Progress Summary     Learner Status Readiness Method Response Comment Documented by    Patient Done Acceptance E,TB VU,DU  CW 03/22/18 1127     Active Acceptance E NR  MA 03/21/18 1145     Done Acceptance E NR,VU  EE 03/20/18 1632     Done Acceptance E,TB VU,DU  CW 03/19/18 1617     Active Acceptance E,TB NR positioning for comfort SI 03/17/18 1357     Active Acceptance E,TB,D NR  RW 03/16/18 1643     Done  Acceptance E,TB VU,DU  CW 03/15/18 1544     Done Acceptance E,TB VU,DU  CW 03/14/18 1502     Active Acceptance E NR  MA 03/13/18 1441    Family Active Acceptance E,TB NR positioning for comfort SI 03/17/18 1357          Point: Precautions (Active)    Learning Progress Summary     Learner Status Readiness Method Response Comment Documented by    Patient Done Acceptance E,TB VU,DU  CW 03/22/18 1127     Active Acceptance E NR  MA 03/21/18 1145     Done Acceptance E,TB VU,DU  CW 03/19/18 1617     Active Acceptance E,TB NR positioning for comfort SI 03/17/18 1357     Active Acceptance E,TB,D NR   03/16/18 1643     Done Acceptance E,TB VU,DU  CW 03/15/18 1544     Done Acceptance E,TB VU,DU  CW 03/14/18 1502     Active Acceptance E NR  MA 03/13/18 1441    Family Active Acceptance E,TB NR positioning for comfort SI 03/17/18 1357                      User Key     Initials Effective Dates Name Provider Type Discipline     05/18/15 -  Ayde Chu, PTA Physical Therapy Assistant PT     12/01/15 -  Traci Rowell, PT Physical Therapist PT     03/07/18 -  Jillian Byrne, PTA Physical Therapy Assistant PT    MA 03/07/18 -  Angelica Stout, PT Physical Therapist PT     03/07/18 -  Gilberto Maravilla, PTA Physical Therapy Assistant PT                    PT Recommendation and Plan  Therapy Frequency (PT Clinical Impression): daily  Plan of Care Reviewed With: patient  Progress: improving  Outcome Summary: Pt increased with bed mobility and activity toelrance but fatifues with amb and gets worried about falling          Outcome Measures     Row Name 03/22/18 1100 03/21/18 1100 03/20/18 1600       How much help from another person do you currently need...    Turning from your back to your side while in flat bed without using bedrails? 4  -CW 4  -MA 3  -EE    Moving from lying on back to sitting on the side of a flat bed without bedrails? 4  -CW 3  -MA 3  -EE    Moving to and from a bed to a chair (including a wheelchair)?  3  -CW 3  -MA 3  -EE    Standing up from a chair using your arms (e.g., wheelchair, bedside chair)? 3  -CW 3  -MA 3  -EE    Climbing 3-5 steps with a railing? 2  -CW 1  -MA 2  -EE    To walk in hospital room? 3  -CW 3  -MA 3  -EE    AM-PAC 6 Clicks Score 19  -CW 17  -MA 17  -EE       Functional Assessment    Outcome Measure Options AM-PAC 6 Clicks Basic Mobility (PT)  -CW AM-PAC 6 Clicks Basic Mobility (PT)  -MA AM-PAC 6 Clicks Basic Mobility (PT)  -EE    Row Name 03/19/18 1600             How much help from another person do you currently need...    Turning from your back to your side while in flat bed without using bedrails? 3  -CW      Moving from lying on back to sitting on the side of a flat bed without bedrails? 3  -CW      Moving to and from a bed to a chair (including a wheelchair)? 3  -CW      Standing up from a chair using your arms (e.g., wheelchair, bedside chair)? 3  -CW      Climbing 3-5 steps with a railing? 2  -CW      To walk in hospital room? 3  -CW      AM-PAC 6 Clicks Score 17  -CW         Functional Assessment    Outcome Measure Options AM-PAC 6 Clicks Basic Mobility (PT)  -CW        User Key  (r) = Recorded By, (t) = Taken By, (c) = Cosigned By    Initials Name Provider Type    EE Traci Rowell, PT Physical Therapist    MARBELLA Stout, PT Physical Therapist    CARLITOS Maravilla PTA Physical Therapy Assistant           Time Calculation:         PT Charges     Row Name 03/22/18 1128             Time Calculation    Start Time 1110  -CW      Stop Time 1128  -CW      Time Calculation (min) 18 min  -CW      PT Received On 03/22/18  -CW      PT - Next Appointment 03/23/18  -CW        User Key  (r) = Recorded By, (t) = Taken By, (c) = Cosigned By    Initials Name Provider Type    CARLITOS Maravilla PTA Physical Therapy Assistant          Therapy Charges for Today     Code Description Service Date Service Provider Modifiers Qty    55182848799 HC PT THER PROC EA 15 MIN 3/22/2018 Gilberto TOVAR  Taiwo, PTA GP 1          PT G-Codes  Outcome Measure Options: AM-PAC 6 Clicks Basic Mobility (PT)    Gilberto Maravilla, GILLIAN  3/22/2018

## 2025-01-28 DIAGNOSIS — I10 HYPERTENSION, UNSPECIFIED TYPE: ICD-10-CM

## 2025-01-28 RX ORDER — LISINOPRIL 10 MG/1
10 TABLET ORAL
Qty: 30 TABLET | Refills: 3 | Status: SHIPPED | OUTPATIENT
Start: 2025-01-28

## 2025-01-28 NOTE — TELEPHONE ENCOUNTER
I called pt who was wondering when he will get his epclusa. I explained to pt rx was sent to [facility] on 1/24/2025 per Sarah Christianson. PA is required, once medication is approved pharmacy will contact pt. I also explained to pt he is to call and speak with me re HCV education prior to starting medication. Pt verbalizes understanding

## 2025-02-03 ENCOUNTER — TELEPHONE (OUTPATIENT)
Dept: INFECTIOUS DISEASES | Facility: CLINIC | Age: 65
End: 2025-02-03
Payer: MEDICARE

## 2025-02-03 NOTE — TELEPHONE ENCOUNTER
----- Message from Nurse Casper sent at 2/3/2025 12:55 PM CST -----    ----- Message -----  From: Holly Kahn  Sent: 2/3/2025  12:07 PM CST  To: #    Pt of Sigrid or Eduardo Dawn from [employer] requesting a call back from  or Eduardo.      Nereyda number [phone]      Please advise

## 2025-02-04 ENCOUNTER — TELEPHONE (OUTPATIENT)
Dept: INFECTIOUS DISEASES | Facility: CLINIC | Age: 65
End: 2025-02-04
Payer: MEDICARE

## 2025-02-04 DIAGNOSIS — B18.2 CHRONIC HEPATITIS C WITHOUT HEPATIC COMA: Primary | ICD-10-CM

## 2025-02-04 RX ORDER — GLECAPREVIR AND PIBRENTASVIR 40; 100 MG/1; MG/1
3 TABLET, FILM COATED ORAL DAILY
Qty: 90 TABLET | Refills: 1 | Status: SHIPPED | OUTPATIENT
Start: 2025-02-04

## 2025-02-04 NOTE — TELEPHONE ENCOUNTER
Recd a note from insurance stating Mavyret is the preferred Hep C agent.  DDI check showed no interactions. I will plan to proceed with Mavyret 3 tabs po  q day x 8 weeks.  Hep C VL is 15,[id number].  Please call the patient and let him know how to take the Mavyret correctly.

## 2025-02-04 NOTE — TELEPHONE ENCOUNTER
I spoke with stacy from [facility] who stated she has initiated PA for epclusa and is in processing

## 2025-02-04 NOTE — TELEPHONE ENCOUNTER
----- Message from Rachelle sent at 2/4/2025  2:29 PM CST -----  Patient of Noemi Adams with [employer] called, stating the request for PA is incomplete, and will be denied if no response by 2/6/25.      Medication sofosbuvir-velpatasvir 400-100 mg Tab.    [phone]  2:31  Thanks,

## 2025-02-04 NOTE — TELEPHONE ENCOUNTER
Letter received from insurance stating mavyret is preferred formulary for treatment of HCV, is there any reason pt is unable to take mavyret. Please advise   The form was sent to the Physician's Nurse MSG Pool via In-Basket for review and signature.    Unable to release the form until a signed Authorization is received.

## 2025-02-05 NOTE — TELEPHONE ENCOUNTER
Noted thank you. I called and explained to pt that he will be changed from epclusa to mavyret  with directions and instructions. I informed pt he will need to contact clinic prior to starting medication to go over education and follow ups. Pt verbalizes understanding

## 2025-02-05 NOTE — TELEPHONE ENCOUNTER
Pennie Theodore LPN       2/4/25  3:06 PM  Note  Noted, letter was faxed to us and Eduardo is working on this.        Pennie Theodore LPN CG    2/4/25  3:06 PM  Note  ----- Message from Rachelle sent at 2/4/2025  2:29 PM CST -----  Patient of Noemi Adams with [employer] called, stating the request for PA is incomplete, and will be denied if no response by 2/6/25.       Medication sofosbuvir-velpatasvir 400-100 mg Tab.       2:31  Thanks,              KR    2/4/25  7:45 AM  You routed this conversation to Noemi Tam FNP  [facility]    2/4/25  7:45 AM  Note  Letter received from insurance stating mavyret is preferred formulary for treatment of HCV, is there any reason pt is unable to take mavyret. Please advise

## 2025-02-10 ENCOUNTER — TELEPHONE (OUTPATIENT)
Dept: INFECTIOUS DISEASES | Facility: CLINIC | Age: 65
End: 2025-02-10
Payer: MEDICARE

## 2025-02-10 NOTE — TELEPHONE ENCOUNTER
Informed pt rx was sent to [facility] and we will inform him when medication comes in. He's scheduled for a fibroscan on Friday and asked if he can come in at the same time for teaching and scan, I informed him that if his meds are in we can schedule him for a teaching if not he's wanting to reschedule his scan due to transportation issues   Infected wound right ankle

## 2025-02-10 NOTE — TELEPHONE ENCOUNTER
----- Message from Rachelle sent at 2/10/2025 10:52 AM CST -----  Patient of Noemi    Patient called regarding refill (MAVYRET)    Pharmacy [facility] (if they don't have, send to [facility] in [place]).  He would like to get this done today.    [phone]  10:54  Thanks,

## 2025-02-12 NOTE — TELEPHONE ENCOUNTER
I called and spoke with pt. I explained his mavyret was finally approved by insurance but will not be available with [facility] until middle of next week when shipment comes in. Pt verbalizes understanding and stated he will come for fibroscan on 2/14/2024 and will await for [facility] to receive mavyret

## 2025-02-14 ENCOUNTER — PROCEDURE VISIT (OUTPATIENT)
Dept: GASTROENTEROLOGY | Facility: CLINIC | Age: 65
End: 2025-02-14
Payer: MEDICARE

## 2025-02-14 DIAGNOSIS — B18.2 CHRONIC HEPATITIS C WITHOUT HEPATIC COMA: ICD-10-CM

## 2025-02-14 NOTE — PROCEDURES
Fibroscan Procedure     Name: Teo Santiago  Date of Procedure :   Interpreting Physician: Luis Hillman MD  Diagnosis: HCV    Probe: M    Fibroscan readin.4 kPa    Fibrosis: F 0-1     CAP readin dB/m    Steatosis: S0      Miscellaneous:

## 2025-02-20 ENCOUNTER — CLINICAL SUPPORT (OUTPATIENT)
Dept: INFECTIOUS DISEASES | Facility: CLINIC | Age: 65
End: 2025-02-20
Payer: MEDICARE

## 2025-02-20 DIAGNOSIS — B18.2 CHRONIC HEPATITIS C WITHOUT HEPATIC COMA: Primary | ICD-10-CM

## 2025-02-20 NOTE — PROGRESS NOTES
Patient came in today for HCV treatment education. The following instructions were given to pt:  Start Epclusa 400/100mg 1 tablet by mouth daily, preferably with evening meal.  Drink plenty of fluids.  Most common side effects are headache and fatigue, which should get better once taking medication.  Avoid alcohol and acetaminophen. May take ibuprofen prn unless contraindicated.   Notify clinic before starting any new prescriptions or over the counter medications.  If a dose is missed do not double up, continue with next scheduled dose or contact clinic.   Contact [facility] pharmacy 1 week prior to needing refill.  Appt date and time for lab order and follow up appts given to pt.  Rx for epclusa given to pt.  Copy of above information given to pt    Pt verbalizes understanding of all above instructions.

## 2025-02-21 ENCOUNTER — TELEPHONE (OUTPATIENT)
Dept: INFECTIOUS DISEASES | Facility: CLINIC | Age: 65
End: 2025-02-21
Payer: MEDICARE

## 2025-02-21 NOTE — TELEPHONE ENCOUNTER
Appts changed from Abigail to kip. Pt aware. Pt was wanting to know if he can eat food after taking epclusa. I informed the patient he is able to eat before and after taking eplcusa. There is no diet change requirements, Pt verbalizes understanding

## 2025-02-21 NOTE — TELEPHONE ENCOUNTER
----- Message from Nurse Casper sent at 2/20/2025  3:17 PM CST -----  Regarding: FW: Vane Call Back  Also pt's appt will need to be changed to Noemi  ----- Message -----  From: Christin Lundberg  Sent: 2/20/2025   3:10 PM CST  To: [facility] Infectious Disease Clinical Support Sta#  Subject: Vane Call Back                                Patient of EmiliePatient called into clinic requesting a call back regarding his medication.[phone] Umer

## 2025-03-20 ENCOUNTER — OFFICE VISIT (OUTPATIENT)
Dept: INFECTIOUS DISEASES | Facility: CLINIC | Age: 65
End: 2025-03-20
Payer: MEDICARE

## 2025-03-20 ENCOUNTER — LAB VISIT (OUTPATIENT)
Dept: LAB | Facility: HOSPITAL | Age: 65
End: 2025-03-20
Attending: NURSE PRACTITIONER
Payer: MEDICARE

## 2025-03-20 VITALS
WEIGHT: 146.25 LBS | RESPIRATION RATE: 16 BRPM | HEIGHT: 65 IN | BODY MASS INDEX: 24.37 KG/M2 | SYSTOLIC BLOOD PRESSURE: 114 MMHG | TEMPERATURE: 98 F | HEART RATE: 80 BPM | DIASTOLIC BLOOD PRESSURE: 76 MMHG

## 2025-03-20 DIAGNOSIS — K76.0 HEPATIC STEATOSIS: ICD-10-CM

## 2025-03-20 DIAGNOSIS — F17.210 CIGARETTE NICOTINE DEPENDENCE WITHOUT COMPLICATION: ICD-10-CM

## 2025-03-20 DIAGNOSIS — B18.2 CHRONIC HEPATITIS C WITHOUT HEPATIC COMA: ICD-10-CM

## 2025-03-20 DIAGNOSIS — B18.2 CHRONIC HEPATITIS C WITHOUT HEPATIC COMA: Primary | ICD-10-CM

## 2025-03-20 LAB
ALBUMIN SERPL-MCNC: 4 G/DL (ref 3.4–4.8)
ALBUMIN/GLOB SERPL: 1 RATIO (ref 1.1–2)
ALP SERPL-CCNC: 116 UNIT/L (ref 40–150)
ALT SERPL-CCNC: 16 UNIT/L (ref 0–55)
ANION GAP SERPL CALC-SCNC: 3 MEQ/L
AST SERPL-CCNC: 14 UNIT/L (ref 5–34)
BASOPHILS # BLD AUTO: 0.06 X10(3)/MCL
BASOPHILS NFR BLD AUTO: 0.8 %
BILIRUB SERPL-MCNC: 0.4 MG/DL
BUN SERPL-MCNC: 12 MG/DL (ref 8.4–25.7)
CALCIUM SERPL-MCNC: 9.9 MG/DL (ref 8.8–10)
CHLORIDE SERPL-SCNC: 109 MMOL/L (ref 98–107)
CO2 SERPL-SCNC: 26 MMOL/L (ref 23–31)
CREAT SERPL-MCNC: 0.85 MG/DL (ref 0.72–1.25)
CREAT/UREA NIT SERPL: 14
EOSINOPHIL # BLD AUTO: 0.41 X10(3)/MCL (ref 0–0.9)
EOSINOPHIL NFR BLD AUTO: 5.6 %
ERYTHROCYTE [DISTWIDTH] IN BLOOD BY AUTOMATED COUNT: 11.6 % (ref 11.5–17)
GFR SERPLBLD CREATININE-BSD FMLA CKD-EPI: >60 ML/MIN/1.73/M2
GLOBULIN SER-MCNC: 4.1 GM/DL (ref 2.4–3.5)
GLUCOSE SERPL-MCNC: 100 MG/DL (ref 82–115)
HCT VFR BLD AUTO: 47.5 % (ref 42–52)
HGB BLD-MCNC: 16.2 G/DL (ref 14–18)
IMM GRANULOCYTES # BLD AUTO: 0.01 X10(3)/MCL (ref 0–0.04)
IMM GRANULOCYTES NFR BLD AUTO: 0.1 %
LYMPHOCYTES # BLD AUTO: 2.49 X10(3)/MCL (ref 0.6–4.6)
LYMPHOCYTES NFR BLD AUTO: 34.3 %
MCH RBC QN AUTO: 33.5 PG (ref 27–31)
MCHC RBC AUTO-ENTMCNC: 34.1 G/DL (ref 33–36)
MCV RBC AUTO: 98.1 FL (ref 80–94)
MONOCYTES # BLD AUTO: 0.55 X10(3)/MCL (ref 0.1–1.3)
MONOCYTES NFR BLD AUTO: 7.6 %
NEUTROPHILS # BLD AUTO: 3.74 X10(3)/MCL (ref 2.1–9.2)
NEUTROPHILS NFR BLD AUTO: 51.6 %
NRBC BLD AUTO-RTO: 0 %
PLATELET # BLD AUTO: 270 X10(3)/MCL (ref 130–400)
PMV BLD AUTO: 9 FL (ref 7.4–10.4)
POTASSIUM SERPL-SCNC: 4.5 MMOL/L (ref 3.5–5.1)
PROT SERPL-MCNC: 8.1 GM/DL (ref 5.8–7.6)
RBC # BLD AUTO: 4.84 X10(6)/MCL (ref 4.7–6.1)
SODIUM SERPL-SCNC: 138 MMOL/L (ref 136–145)
WBC # BLD AUTO: 7.26 X10(3)/MCL (ref 4.5–11.5)

## 2025-03-20 PROCEDURE — 99214 OFFICE O/P EST MOD 30 MIN: CPT | Mod: PBBFAC | Performed by: NURSE PRACTITIONER

## 2025-03-20 PROCEDURE — 80053 COMPREHEN METABOLIC PANEL: CPT

## 2025-03-20 PROCEDURE — 87522 HEPATITIS C REVRS TRNSCRPJ: CPT

## 2025-03-20 PROCEDURE — 36415 COLL VENOUS BLD VENIPUNCTURE: CPT

## 2025-03-20 PROCEDURE — 85025 COMPLETE CBC W/AUTO DIFF WBC: CPT

## 2025-03-20 NOTE — PROGRESS NOTES
Patient ID: Teo Santiago 64 y.o.     Chief Complaint:   Chief Complaint   Patient presents with    Follow-up     Hep C        HPI:    3/20/25  Teo Santiago is a 65 y/o WM here for Hep C f/u visit.  He was started on Mavyret 3 po q day x 8 weeks on 2/20/25. Pt states he is tolerating the meds well. Denies fever, headache, chills, visual problems, icterus, jaundice, N/V/D, esophageal varices, SOB, cough, abd pain, ascites or kiran colored stools. Pt is amenable to labs today. Reviewed labs. Pretreatment labs 1/9/25 [place] [address], 2a, F1.  1/17/25 Hep C VL 5590. US done 11/19/24 shows hepatic steatosis. Contour is normal.  No focal liver lesions. He is amenable to labs today. Refuses vaccines. States he is still drinking alcohol, but has limited to his drinks to one or two per day. Pt smokes 1 ppd of cigarettes and has no interested in quitting.    1/9/25  Teo Santiago is a 65 y/o WM here for initial Hep C visit.  Dx 11/11/24 and referred to clinic by ANDREW Lowe.  Past hx of IVDU 40 years ago and two professional tattoos about 25 years ago. Pt denies blood transfusion. States he drinks beer occasionally.  Denies drug use. Pt is . He states he is pretty sure his wife is negative, because she has had cancer with a full workup. Pt denies fever, headache, chills, visual problems, icterus, jaundice, N/V/D, esophageal varices, SOB, cough, abd pain, ascites or kiran colored stools. Co-morbidities include HTN and BPH.  Reviewed referring labs from 11/2/24 Hep C ab positive, Hep C VL 8910, HIV neg.  Pt will need updated labs today. US done 11/19/24 shows hepatic steatosis. Contour is normal.  No focal liver lesions. FibroScan is scheduled for 2/14/25.  Vaccines recommended.PT is not amenable. Pt smokes 1 ppd of cigarettes.         Past Medical History:   Diagnosis Date    BPH (benign prostatic hyperplasia)     COPD (chronic obstructive pulmonary disease)     GERD (gastroesophageal reflux disease)      Hypertension     Rheumatoid arthritis, unspecified         Past Surgical History:   Procedure Laterality Date    SPINE SURGERY  06/09/2020    METRX DLL LEFT L4/5, 06/09/20, DR. ROSARIO KELLEY.    TONSILLECTOMY          Social History     Socioeconomic History    Marital status:    Tobacco Use    Smoking status: Every Day     Current packs/day: 1.00     Average packs/day: 1 pack/day for 38.0 years (38.0 ttl pk-yrs)     Types: Cigarettes    Smokeless tobacco: Never   Substance and Sexual Activity    Alcohol use: Yes     Alcohol/week: 6.0 standard drinks of alcohol     Types: 6 Cans of beer per week     Comment: socially    Drug use: Not Currently     Types: Cocaine, Methamphetamines    Sexual activity: Yes     Partners: Female     Social Drivers of Health     Financial Resource Strain: High Risk (11/12/2024)    Received from [facility] SDOH Screening     In the past year, have you been unable to get any of the following when you really needed them? choose all that apply.: Clothing   Transportation Needs: High Risk (8/13/2024)    Received from [facility] SDOH Screening     Has lack of transportation kept you from medical appointments, meetings, work or from getting things needed for daily living? choose all that apply.: Yes, it has kept me from non-medical meetings, appointments, work or from getting things that i need     Has lack of transportation kept you from medical appointments, meetings, work or from getting things needed for daily living? choose all that apply.: Yes, it has kept me from medical appointments or from getting my medications   Housing Stability: High Risk (11/12/2024)    Received from [facility] SDOH Screening     In the past year, have you been unable to get any of the following when you really needed them? choose all that apply.: Utilities (electric, gas, and water)        Family History   Problem Relation Name Age of Onset    Diabetes Mother      ALS  "Father      Cancer Sister          Review of patient's allergies indicates:  No Known Allergies     Immunization History   Administered Date(s) Administered    COVID-19 Vaccine 03/03/2021, 03/31/2021, 12/21/2021    COVID-19, MRNA, LN-S, PF (MODERNA FULL 0.5 ML DOSE) 03/03/2021, 03/31/2021, 12/21/2021    Tdap 07/28/2021, 11/07/2023        Review of Systems   Constitutional: Negative.    HENT: Negative.     Eyes: Negative.    Respiratory: Negative.     Cardiovascular: Negative.    Gastrointestinal: Negative.    Genitourinary: Negative.    Musculoskeletal: Negative.    Skin: Negative.    Neurological: Negative.    Endo/Heme/Allergies: Negative.    Psychiatric/Behavioral: Negative.     All other systems reviewed and are negative.         Objective:      /76   Pulse 80   Temp 97.9 °F (36.6 °C) (Oral)   Resp 16   Ht 5' 5" (1.651 m)   Wt 66.3 kg (146 lb 4.4 oz)   BMI 24.34 kg/m²      Physical Exam  Vitals reviewed.   Constitutional:       General: He is not in acute distress.     Appearance: Normal appearance.   HENT:      Head: Normocephalic.      Right Ear: External ear normal.      Left Ear: External ear normal.      Nose: Nose normal.      Mouth/Throat:      Mouth: Mucous membranes are moist.      Pharynx: Oropharynx is clear.   Eyes:      General: No scleral icterus.     Extraocular Movements: Extraocular movements intact.      Conjunctiva/sclera: Conjunctivae normal.      Pupils: Pupils are equal, round, and reactive to light.   Cardiovascular:      Rate and Rhythm: Normal rate and regular rhythm.      Pulses: Normal pulses.      Heart sounds: Normal heart sounds.   Pulmonary:      Effort: Pulmonary effort is normal. No respiratory distress.      Breath sounds: Normal breath sounds.   Abdominal:      General: Bowel sounds are normal. There is no distension.      Palpations: Abdomen is soft. There is no mass.      Tenderness: There is no abdominal tenderness. There is no right CVA tenderness or left CVA " tenderness.      Hernia: No hernia is present.   Musculoskeletal:         General: No tenderness or signs of injury. Normal range of motion.      Cervical back: Normal range of motion and neck supple.      Right lower leg: No edema.      Left lower leg: No edema.   Lymphadenopathy:      Cervical: No cervical adenopathy.   Skin:     General: Skin is warm and dry.      Capillary Refill: Capillary refill takes less than 2 seconds.      Findings: No erythema or lesion.   Neurological:      General: No focal deficit present.      Mental Status: He is alert and oriented to person, place, and time. Mental status is at baseline.   Psychiatric:         Mood and Affect: Mood normal.         Behavior: Behavior normal.         Thought Content: Thought content normal.         Judgment: Judgment normal.          Labs:   Lab Results   Component Value Date    WBC 7.26 03/20/2025    HGB 16.2 03/20/2025    HCT 47.5 03/20/2025    MCV 98.1 (H) 03/20/2025     03/20/2025       CMP  Sodium   Date Value Ref Range Status   01/09/2025 136 136 - 145 mmol/L Final     Potassium   Date Value Ref Range Status   01/09/2025 4.0 3.5 - 5.1 mmol/L Final     Chloride   Date Value Ref Range Status   01/09/2025 104 98 - 107 mmol/L Final     CO2   Date Value Ref Range Status   01/09/2025 28 23 - 31 mmol/L Final     Blood Urea Nitrogen   Date Value Ref Range Status   01/09/2025 12.2 8.4 - 25.7 mg/dL Final     Creatinine   Date Value Ref Range Status   01/09/2025 0.85 0.72 - 1.25 mg/dL Final     Calcium   Date Value Ref Range Status   01/09/2025 9.8 8.8 - 10.0 mg/dL Final     Albumin   Date Value Ref Range Status   01/09/2025 4.2 3.4 - 4.8 g/dL Final     Bilirubin Total   Date Value Ref Range Status   01/09/2025 0.4 <=1.5 mg/dL Final     ALP   Date Value Ref Range Status   01/09/2025 115 40 - 150 unit/L Final     AST   Date Value Ref Range Status   01/09/2025 21 5 - 34 unit/L Final     ALT   Date Value Ref Range Status   01/09/2025 25 0 - 55 unit/L  Final     eGFR   Date Value Ref Range Status   01/09/2025 >60 mL/min/1.73/m2 Final     Lab Results   Component Value Date    TSH 2.150 11/02/2024     HCV Genotype   Date Value Ref Range Status   01/09/2025 Indeterminate (A) Undetected Final     Comment:     HCV RNA detected, but genotype could not be determined. HCV   genotype resolution testing is ordered.     -------------------ADDITIONAL INFORMATION-------------------  This test was performed using the Abbott RealTime HCV   Genotype II assay ("[employer] [place], [place]).     Test Performed by:  [place] [address]  : Nicolasa Fall Ph.D.; CLIA# [id number]     HCV RNA Detect/Quant   Date Value Ref Range Status   01/17/2025 5590 (A) Undetected IU/mL Final     Comment:     Result in log IU/mL is 3.75.     -------------------ADDITIONAL INFORMATION-------------------  The quantification range of this assay is 15 to 100,000,000   IU/mL (1.18 log to 8.00 log IU/mL). Testing was performed   using the stephanie HCV test ([employer]     Test Performed by:  [place] [address]  : Nicolasa Fall Ph.D.; CLIA# [id number]     HIV   Date Value Ref Range Status   01/17/2025 Nonreactive Nonreactive Final     INR   Date Value Ref Range Status   01/09/2025 1.0 <=1.3 Final     Syphilis Antibody   Date Value Ref Range Status   01/09/2025 Nonreactive Nonreactive, Equivocal Final     Hep BsAb Interp   Date Value Ref Range Status   01/09/2025 Nonreactive Nonreactive Final     Hep BsAb   Date Value Ref Range Status   01/09/2025 6.12 mIU/mL Final     Comment:     Interpretive Data Hep Bs Ab#  Result (mIU/mL)Interpretation - Hep B Surface Antibody  <8.00Nonreactive: Patient considered not immune to HBV infection  >=8.00 to <12.00Grayzone: Unable to determine immune status.  Follow-up testing should be performed  >=12.00Reactive: Patient considered immune to HBV infection       Hep BcAb Interp   Date Value Ref Range Status   01/17/2025 Reactive (A) Nonreactive Final     Hep A IgG Interp   Date Value Ref Range Status   01/09/2025 Reactive (A) Nonreactive Final     FibroTest Stage   Date Value Ref Range Status   01/09/2025 F1  Final     ActiTest Grade   Date Value Ref Range Status   01/09/2025 A0  Final     Alpha-2-Macroglobulin   Date Value Ref Range Status   01/09/2025 272 100 - 280 mg/dL Final     Haptoglobin   Date Value Ref Range Status   01/09/2025 209 (H) 30 - 200 mg/dL Final     Alanine Aminotransferase (ALT)   Date Value Ref Range Status   01/09/2025 28 7 - 55 U/L Final     Gamma Glutamyltransferase (GGT)   Date Value Ref Range Status   01/09/2025 16 8 - 61 U/L Final     Bilirubin, Total   Date Value Ref Range Status   01/09/2025 0.3 0.0 - 1.2 mg/dL Final     Comment:        Test Performed by:  [facility]  [address] [place] [address]  : Nicolasa Fall Ph.D.; CLIA# [id number]     Test Performed by:  [facility]  [address] [place] [address]  : Nicolasa Fall Ph.D.; CLIA# [id number]     Ferritin Level   Date Value Ref Range Status   01/17/2025 344.90 (H) 21.81 - 274.66 ng/mL Final     Antinuclear Ab, HEp-2 Substrate   Date Value Ref Range Status   01/09/2025 See Scanned Result  Final       Imaging: Reviewed most recent relevant imaging studies available, notable results highlighted in this note      Medications:     Current Outpatient Medications   Medication Instructions    albuterol (PROVENTIL HFA) 90 mcg/actuation inhaler 2 puffs, Inhalation, Every 6 hours PRN, Rescue    cetirizine (ZYRTEC) 10 mg, Oral, Daily    gabapentin (NEURONTIN) 300 MG capsule take one capsule 3 times daily    GEMTESA 75 mg Tab 1 tablet    glecaprevir-pibrentasvir  (MAVYRET) 100-40 mg Tab 3 tablets, Oral, Daily    ibuprofen (ADVIL,MOTRIN) 800 mg, Oral, Every 6 hours PRN    lisinopriL 10 mg, Oral       Assessment:       1. Chronic hepatitis C without hepatic coma  -     Hepatitis C RNA, Quantitative, PCR; Future; Expected date: 04/18/2025  -     CBC Auto Differential; Future; Expected date: 04/18/2025  -     Comprehensive Metabolic Panel; Future; Expected date: 04/18/2025  -     Cancel: Hepatitis C RNA, Quantitative, PCR; Future; Expected date: 03/20/2025  -     Cancel: CBC Auto Differential; Future; Expected date: 03/20/2025  -     Cancel: Comprehensive Metabolic Panel; Future; Expected date: 03/20/2025  -     Hepatitis C RNA, Quantitative, PCR; Future; Expected date: 03/20/2025  -     CBC Auto Differential; Future; Expected date: 03/20/2025  -     Comprehensive Metabolic Panel; Future; Expected date: 03/20/2025    2. Hepatic steatosis    3. Cigarette nicotine dependence without complication          Plan:      Chronic hepatitis C without hepatic coma  -     Hepatitis C RNA, Quantitative, PCR; Future; Expected date: 04/18/2025  -     CBC Auto Differential; Future; Expected date: 04/18/2025  -     Comprehensive Metabolic Panel; Future; Expected date: 04/18/2025  -     Cancel: Hepatitis C RNA, Quantitative, PCR; Future; Expected date: 03/20/2025  -     Cancel: CBC Auto Differential; Future; Expected date: 03/20/2025  -     Cancel: Comprehensive Metabolic Panel; Future; Expected date: 03/20/2025  -     Hepatitis C RNA, Quantitative, PCR; Future; Expected date: 03/20/2025  -     CBC Auto Differential; Future; Expected date: 03/20/2025  -     Comprehensive Metabolic Panel; Future; Expected date: 03/20/2025  Continue Mavyret as directed   Start date 2/20/25  Projected end date 4/14/25  RTC 4/24/25 @ 950 am   Labs today and at the end of treatment   Pt encouraged to refrain from alcohol and illicit drug use.  Blood and sex precautions discussed. Do not share a needle, razor, nail  clippers, toothbrush, or drug paraphernalia with anyone.  No Tylenol at doses greater than 2000 mg per day.    Vaccines recommended. Pt refused.     Hepatic steatosis  Goal BMI < 25.   Limit consumption of high fat foods, high sugar foods, and salt.   Avoid alcohol and illicit drug use.  Increase exercise activity; 30 minutes of strenuous activity 3-5 x week.     Cigarette nicotine dependence without complication  Spent approx 3 minutes discussing smoking cessation   Pt is not ready to quit.  Discussed benefits of quitting: improved overall health, decreased cardiac/vascular/pulmonary/stroke risks, as well as cost savings.

## 2025-03-21 ENCOUNTER — TELEPHONE (OUTPATIENT)
Dept: INFECTIOUS DISEASES | Facility: CLINIC | Age: 65
End: 2025-03-21
Payer: MEDICARE

## 2025-03-21 LAB
HCV RNA SERPL NAA+PROBE-ACNC: NOT DETECTED K[IU]/ML
HCV RNA SERPL NAA+PROBE-LOG IU: NOT DETECTED {LOG_IU}/ML

## 2025-03-21 NOTE — TELEPHONE ENCOUNTER
Patient informed of results and providers recommendations- VL undetected. All questions answered. Patient verbalized understanding.

## 2025-03-21 NOTE — TELEPHONE ENCOUNTER
----- Message from Holly sent at 3/21/2025  1:47 PM CDT -----  Pt yahaira Le called requesting lab results.Pt call back number [phone] advise

## 2025-04-14 DIAGNOSIS — B18.2 CHRONIC HEPATITIS C WITHOUT HEPATIC COMA: Primary | ICD-10-CM

## 2025-04-21 ENCOUNTER — TELEPHONE (OUTPATIENT)
Dept: INFECTIOUS DISEASES | Facility: CLINIC | Age: 65
End: 2025-04-21
Payer: MEDICARE

## 2025-04-21 ENCOUNTER — LAB VISIT (OUTPATIENT)
Dept: LAB | Facility: HOSPITAL | Age: 65
End: 2025-04-21
Attending: NURSE PRACTITIONER
Payer: MEDICARE

## 2025-04-21 DIAGNOSIS — B18.2 CHRONIC HEPATITIS C WITHOUT HEPATIC COMA: ICD-10-CM

## 2025-04-21 LAB
ALBUMIN SERPL-MCNC: 3.7 G/DL (ref 3.4–4.8)
ALBUMIN/GLOB SERPL: 1 RATIO (ref 1.1–2)
ALP SERPL-CCNC: 124 UNIT/L (ref 40–150)
ALT SERPL-CCNC: 14 UNIT/L (ref 0–55)
ANION GAP SERPL CALC-SCNC: 6 MEQ/L
AST SERPL-CCNC: 16 UNIT/L (ref 11–45)
BASOPHILS # BLD AUTO: 0.03 X10(3)/MCL
BASOPHILS NFR BLD AUTO: 0.4 %
BILIRUB SERPL-MCNC: 0.6 MG/DL
BUN SERPL-MCNC: 12.5 MG/DL (ref 8.4–25.7)
CALCIUM SERPL-MCNC: 9.3 MG/DL (ref 8.8–10)
CHLORIDE SERPL-SCNC: 106 MMOL/L (ref 98–107)
CO2 SERPL-SCNC: 27 MMOL/L (ref 23–31)
CREAT SERPL-MCNC: 0.85 MG/DL (ref 0.72–1.25)
CREAT/UREA NIT SERPL: 15
EOSINOPHIL # BLD AUTO: 0.65 X10(3)/MCL (ref 0–0.9)
EOSINOPHIL NFR BLD AUTO: 9.3 %
ERYTHROCYTE [DISTWIDTH] IN BLOOD BY AUTOMATED COUNT: 11.9 % (ref 11.5–17)
GFR SERPLBLD CREATININE-BSD FMLA CKD-EPI: >60 ML/MIN/1.73/M2
GLOBULIN SER-MCNC: 3.8 GM/DL (ref 2.4–3.5)
GLUCOSE SERPL-MCNC: 95 MG/DL (ref 82–115)
HCT VFR BLD AUTO: 44.5 % (ref 42–52)
HGB BLD-MCNC: 14.9 G/DL (ref 14–18)
IMM GRANULOCYTES # BLD AUTO: 0 X10(3)/MCL (ref 0–0.04)
IMM GRANULOCYTES NFR BLD AUTO: 0 %
LYMPHOCYTES # BLD AUTO: 2.52 X10(3)/MCL (ref 0.6–4.6)
LYMPHOCYTES NFR BLD AUTO: 35.9 %
MCH RBC QN AUTO: 32.7 PG (ref 27–31)
MCHC RBC AUTO-ENTMCNC: 33.5 G/DL (ref 33–36)
MCV RBC AUTO: 97.8 FL (ref 80–94)
MONOCYTES # BLD AUTO: 0.56 X10(3)/MCL (ref 0.1–1.3)
MONOCYTES NFR BLD AUTO: 8 %
NEUTROPHILS # BLD AUTO: 3.25 X10(3)/MCL (ref 2.1–9.2)
NEUTROPHILS NFR BLD AUTO: 46.4 %
PLATELET # BLD AUTO: 248 X10(3)/MCL (ref 130–400)
PMV BLD AUTO: 8.7 FL (ref 7.4–10.4)
POTASSIUM SERPL-SCNC: 4.1 MMOL/L (ref 3.5–5.1)
PROT SERPL-MCNC: 7.5 GM/DL (ref 5.8–7.6)
RBC # BLD AUTO: 4.55 X10(6)/MCL (ref 4.7–6.1)
SODIUM SERPL-SCNC: 139 MMOL/L (ref 136–145)
WBC # BLD AUTO: 7.01 X10(3)/MCL (ref 4.5–11.5)

## 2025-04-21 PROCEDURE — 80053 COMPREHEN METABOLIC PANEL: CPT

## 2025-04-21 PROCEDURE — 36415 COLL VENOUS BLD VENIPUNCTURE: CPT

## 2025-04-21 PROCEDURE — 85025 COMPLETE CBC W/AUTO DIFF WBC: CPT

## 2025-04-21 PROCEDURE — 87522 HEPATITIS C REVRS TRNSCRPJ: CPT

## 2025-04-21 NOTE — TELEPHONE ENCOUNTER
Spoke to pt and informed importance of coming to appt and reminded him of the agreement he signed prior to starting tx he verbalized understanding and stated he would try and make it if not he would call to reschedule.

## 2025-04-21 NOTE — TELEPHONE ENCOUNTER
----- Message from Rachelle sent at 4/21/2025  9:37 AM CDT -----  Patient of MandyPatient called stating he took labs and asked if he has to come to his upcoming appt.  He stated he don't want to come if it just for labs.[phone],

## 2025-04-23 LAB — HCV RNA SERPL NAA+PROBE-ACNC: NORMAL IU/ML

## 2025-05-09 ENCOUNTER — TELEPHONE (OUTPATIENT)
Dept: INFECTIOUS DISEASES | Facility: CLINIC | Age: 65
End: 2025-05-09
Payer: MEDICARE

## 2025-05-09 NOTE — TELEPHONE ENCOUNTER
I called pt to reschedule 8 week hcv f/u, pt is requesting to do a virtual visit. He will have daughter download damien on his phone and call  back when he is ready to schedule   Primary Care Provider  Arias Beck MD     Referring Provider  No ref. provider found     Chief Complaint  Asthma-COPD overlap syndrome, Cough, Shortness of Breath, Wheezing, and Follow-up (6 month follow up. )    Subjective          History of Presenting Illness  Patient is a 86-year-old female, patient of Dr. Ferrer who presents for management of asthma/COPD overlap syndrome and obstructive sleep apnea who presents for a follow-up visit today.  Patient states that since last visit her breathing is doing well.  Patient states at times she does get short of breath that is worse with heavy exertion, mild in severity, and improved with rest.  Patient states that she is taking Brovana, Pulmicort, and Spiriva every day as prescribed and uses albuterol inhaler as needed.  Patient is taking Protonix for reflux.  Patient states that she does have a CPAP machine, however it has been recalled and she has not used her CPAP in years and does not want to use a CPAP machine at this time.  Patient denies any morning headaches or excessive daytime sleepiness.  Patient states that she is under the care of Dr. Gutierrez, rheumatologist for rheumatoid arthritis and is on methotrexate and taking daily folic acid.  Patient denies fever, chills, night sweats, swollen glands in the head and neck, unintentional weight loss, hemoptysis, purulent sputum production, dysphagia, chest pain, palpitations, chest tightness, abdominal pain, nausea, vomiting, and diarrhea.  Patient also denies any myalgias, changes in sense of taste and/or smell, sore throat, any other coronavirus or flu-like symptoms.  Patient denies any leg swelling, orthopnea, paroxysmal nocturnal dyspnea.  Patient is able to perform activities of daily living.        Review of Systems     Family History   Problem Relation Age of Onset    Stroke Father     Heart disease Father     Diabetes Father         Social History     Socioeconomic History    Marital status:     Tobacco Use    Smoking status: Former     Current packs/day: 0.00     Average packs/day: 0.3 packs/day for 65.0 years (16.3 ttl pk-yrs)     Types: Cigarettes     Start date:      Quit date:      Years since quittin.4     Passive exposure: Past    Smokeless tobacco: Never    Tobacco comments:     Started smoking as a teenager.   Vaping Use    Vaping status: Never Used   Substance and Sexual Activity    Alcohol use: Never    Drug use: Never    Sexual activity: Not Currently        Past Medical History:   Diagnosis Date    Arthritis     Bowel obstruction     Diabetes mellitus     Hypertension     Tinnitus         Immunization History   Administered Date(s) Administered    COVID-19 (PFIZER) BIVALENT 12+YRS 2022    COVID-19 (PFIZER) Purple Cap Monovalent 2021, 2021    Flu Vaccine Intradermal Quad 18-64YR 10/05/2022    Fluzone High Dose =>65 Years ([employer] ONLY) 2020    Fluzone High-Dose 65+yrs 2023    Pneumococcal Polysaccharide (PPSV23) 10/20/2003    Td (TDVAX) 2001       Allergies   Allergen Reactions    Sulfa Antibiotics Nausea And Vomiting          Current Outpatient Medications:     albuterol sulfate  (90 Base) MCG/ACT inhaler, Inhale 2 puffs Every 4 (Four) Hours As Needed for Wheezing., Disp: 54 g, Rfl: 3    amLODIPine (NORVASC) 5 MG tablet, , Disp: , Rfl:     arformoterol (BROVANA) 15 MCG/2ML nebulizer solution, Take 2 mL by nebulization 2 (Two) Times a Day for 90 days., Disp: 360 mL, Rfl: 3    budesonide (PULMICORT) 0.5 MG/2ML nebulizer solution, Take 2 mL by nebulization 2 (Two) Times a Day for 90 days. Rinse mouth out after each use, Disp: 360 mL, Rfl: 3    Diclofenac Sodium (Voltaren) 1 % gel gel, Apply  topically to the appropriate area as directed Every 12 (Twelve) Hours., Disp: , Rfl:     folic acid (FOLVITE) 1 MG tablet, Take 1 tablet by mouth Daily., Disp: , Rfl:     hydrALAZINE (APRESOLINE) 25 MG tablet, , Disp: , Rfl:     hydroxychloroquine  "(PLAQUENIL) 200 MG tablet, Take 1 tablet by mouth 2 (Two) Times a Day., Disp: , Rfl:     Levemir FlexTouch 100 UNIT/ML injection, Inject 6 Units under the skin into the appropriate area as directed Every Night., Disp: , Rfl:     losartan (COZAAR) 50 MG tablet, Take 1 tablet by mouth Daily. (Patient taking differently: Take 2 tablets by mouth Daily.), Disp: 30 tablet, Rfl: 0    metFORMIN (GLUCOPHAGE) 500 MG tablet, Take 1 tablet by mouth 2 (Two) Times a Day With Meals., Disp: , Rfl:     methotrexate 2.5 MG tablet, Take 10 tablets by mouth 1 (One) Time Per Week., Disp: , Rfl:     metoprolol tartrate (LOPRESSOR) 25 MG tablet, Take 0.5 tablets by mouth 2 (Two) Times a Day for 30 days., Disp: 30 tablet, Rfl: 0    Pramipexole Dihydrochloride (MIRAPEX PO), Take  by mouth 1 (One) Time Per Week., Disp: , Rfl:     Spiriva Respimat 2.5 MCG/ACT aerosol solution inhaler, Inhale 2 puffs Daily for 90 days., Disp: 3 each, Rfl: 3     Objective     Physical Exam  Vital Signs:   WDWN, Alert, NAD.    HEENT:  PERRL, EOMI.  OP, nares clear, no sinus tenderness  Neck:  Supple, no JVD, no thyromegaly.  Lymph: no axillary, cervical, supraclavicular lymphadenopathy noted bilaterally  Chest:  good aeration, clear to auscultation bilaterally, tympanic to percussion bilaterally, no work of breathing noted  CV: RRR, no MGR, pulses 2+, equal.  Abd:  Soft, NT, ND, + BS, no HSM  EXT:  no clubbing, no cyanosis, no edema, no joint tenderness  Neuro:  A&Ox3, CN grossly intact, no focal deficits.  Skin: No rashes or lesions noted.    /73 (BP Location: Left arm, Patient Position: Sitting, Cuff Size: Small Adult)   Pulse 101   Temp 97.7 °F (36.5 °C) (Oral)   Resp 16   Ht 160 cm (62.99\")   Wt 52.3 kg (115 lb 6.4 oz)   SpO2 95% Comment: room air  BMI 20.45 kg/m²         Result Review :   I have reviewed my last office visit note.    Procedures:           Assessment and Plan      Assessment:  1.  Asthma/COPD overlap syndrome.  Patient is on " triple nebulizer/inhaler therapy.  2.  Obstructive sleep apnea. Patient has not used CPAP machine for several years and declines using CPAP machine.  3.  Rheumatoid arthritis.  Patient is on methotrexate managed by Dr. Gutierrez, rheumatologist.   4.  GERD.  Patient is on a PPI.  5.  Tobacco abuse of cigarettes in remission.  Not eligible for lung cancer screening.          Plan:  1.  Continue Brovana, Pulmicort, and Spiriva as prescribed and rinse mouth out after each use.  2.  Continue albuterol inhaler as needed.  3.  Patient continues to decline a CPAP machine.  I discussed the complications of untreated sleep apnea including effects on hypertension, diabetes mellitus and nonrestorative sleep with hypersomnia which can increase risk for motor vehicle accidents.  Untreated sleep apnea is also a risk factor for development of pulmonary hypertension, atrial fibrillation, and stroke.  Patient verbalized understanding.    4.  Methotrexate management per rheumatology.  Patient is advised to continue daily folic acid.  Patient to follow-up with rheumatologist as scheduled.  5.  For GERD,  patient to continue PPI.   Patient is also advised to sleep with the head of the bed elevated and do not eat 3-4 hours prior to bedtime.  6.  Vaccination status: Flu vaccine given to the patient in the office today. Patient reports they are up-to-date with pneumonia and Covid vaccines.  Patient is advised to continue to follow CDC recommendations such as social distancing wearing a mask and washing hands for at least 20 seconds.  7.  Smoking status: Patient is a former cigarette smoker.  Not eligible for lung cancer screening.  8.  Patient to call the office, 911, or go to the ER with new or worsening symptoms.  9.  Follow-up in 6 months, sooner if needed.          Follow Up   Return in about 6 months (around 12/4/2024) for with Dr. Bill or Dottie.  Patient was given instructions and counseling regarding her condition or for health  maintenance advice. Please see specific information pulled into the AVS if appropriate.

## 2025-05-26 DIAGNOSIS — I10 HYPERTENSION, UNSPECIFIED TYPE: ICD-10-CM

## 2025-05-27 RX ORDER — LISINOPRIL 10 MG/1
10 TABLET ORAL
Qty: 30 TABLET | Refills: 3 | Status: SHIPPED | OUTPATIENT
Start: 2025-05-27

## 2025-06-19 ENCOUNTER — TELEPHONE (OUTPATIENT)
Dept: INFECTIOUS DISEASES | Facility: CLINIC | Age: 65
End: 2025-06-19
Payer: MEDICARE

## 2025-06-19 NOTE — TELEPHONE ENCOUNTER
"----- Message from Celestina sent at 6/19/2025 10:41 AM CDT -----  Patient of Noemi Clark/Pennie     Patient unable able to attend appointment scheduled for today     Patient stated,  " his back has given out on him and not sure when he will be able to schedule another appointment"    [phone]       10:43a  "

## 2025-07-08 ENCOUNTER — LAB VISIT (OUTPATIENT)
Dept: LAB | Facility: HOSPITAL | Age: 65
End: 2025-07-08
Attending: NURSE PRACTITIONER
Payer: MEDICARE

## 2025-07-08 ENCOUNTER — OFFICE VISIT (OUTPATIENT)
Dept: INFECTIOUS DISEASES | Facility: CLINIC | Age: 65
End: 2025-07-08
Payer: MEDICARE

## 2025-07-08 VITALS
DIASTOLIC BLOOD PRESSURE: 74 MMHG | BODY MASS INDEX: 24.15 KG/M2 | WEIGHT: 144.94 LBS | TEMPERATURE: 98 F | HEART RATE: 83 BPM | SYSTOLIC BLOOD PRESSURE: 122 MMHG | HEIGHT: 65 IN | RESPIRATION RATE: 16 BRPM

## 2025-07-08 DIAGNOSIS — R76.8 ANA POSITIVE: ICD-10-CM

## 2025-07-08 DIAGNOSIS — K76.0 HEPATIC STEATOSIS: ICD-10-CM

## 2025-07-08 DIAGNOSIS — B18.2 CHRONIC HEPATITIS C WITHOUT HEPATIC COMA: ICD-10-CM

## 2025-07-08 DIAGNOSIS — F17.210 CIGARETTE NICOTINE DEPENDENCE WITHOUT COMPLICATION: ICD-10-CM

## 2025-07-08 DIAGNOSIS — I10 HYPERTENSION, UNSPECIFIED TYPE: ICD-10-CM

## 2025-07-08 DIAGNOSIS — B18.2 CHRONIC HEPATITIS C WITHOUT HEPATIC COMA: Primary | ICD-10-CM

## 2025-07-08 PROCEDURE — 86039 ANTINUCLEAR ANTIBODIES (ANA): CPT

## 2025-07-08 PROCEDURE — 36415 COLL VENOUS BLD VENIPUNCTURE: CPT

## 2025-07-08 PROCEDURE — 99214 OFFICE O/P EST MOD 30 MIN: CPT | Mod: PBBFAC | Performed by: NURSE PRACTITIONER

## 2025-07-08 PROCEDURE — 87522 HEPATITIS C REVRS TRNSCRPJ: CPT

## 2025-07-08 NOTE — PROGRESS NOTES
Patient ID: Teo Santiago 64 y.o.     Chief Complaint:   Chief Complaint   Patient presents with    Follow-up     Hep C        HPI:    7/8/25  Teo Santiago is a 65 y/o WM here for Hep C f/u visit.  He completed 8 weeks of Mavyret on 4/14/25.  Pt tolerated the meds well.  End of treatment labs done 4/21/25 Hep C VL ND.  US 11/27/24 showed hepatic steatosis with no liver lesions.  Blood pressure is elevated today. He states he took his lisinopril around 630 am. Endorses he is still smoking approx 1 pack per day of cigarettes.  Hx of hepatic steatosis and positive LAUREN.  Pt will need updated labs.      3/20/25  Teo Santiago is a 65 y/o WM here for Hep C f/u visit.  He was started on Mavyret 3 po q day x 8 weeks on 2/20/25. Pt states he is tolerating the meds well. Denies fever, headache, chills, visual problems, icterus, jaundice, N/V/D, esophageal varices, SOB, cough, abd pain, ascites or kiran colored stools. Pt is amenable to labs today. Reviewed labs. Pretreatment labs 1/9/25 [place] [address], 2a, F1.  1/17/25 Hep C VL 5590. US done 11/19/24 shows hepatic steatosis. Contour is normal.  No focal liver lesions. He is amenable to labs today. Refuses vaccines. States he is still drinking alcohol, but has limited to his drinks to one or two per day. Pt smokes 1 ppd of cigarettes and has no interested in quitting.     1/9/25  Teo Santiago is a 65 y/o WM here for initial Hep C visit.  Dx 11/11/24 and referred to clinic by ANDREW Lowe.  Past hx of IVDU 40 years ago and two professional tattoos about 25 years ago. Pt denies blood transfusion. States he drinks beer occasionally.  Denies drug use. Pt is . He states he is pretty sure his wife is negative, because she has had cancer with a full workup. Pt denies fever, headache, chills, visual problems, icterus, jaundice, N/V/D, esophageal varices, SOB, cough, abd pain, ascites or kiran colored stools. Co-morbidities include HTN and BPH.  Reviewed  referring labs from 11/2/24 Hep C ab positive, Hep C VL 8910, HIV neg.  Pt will need updated labs today. US done 11/19/24 shows hepatic steatosis. Contour is normal.  No focal liver lesions. FibroScan is scheduled for 2/14/25.  Vaccines recommended.PT is not amenable. Pt smokes 1 ppd of cigarettes.           Past Medical History:   Diagnosis Date    BPH (benign prostatic hyperplasia)     COPD (chronic obstructive pulmonary disease)     GERD (gastroesophageal reflux disease)     Hypertension     Rheumatoid arthritis, unspecified         Past Surgical History:   Procedure Laterality Date    SPINE SURGERY  06/09/2020    METRX DLL LEFT L4/5, 06/09/20, WKN, DR. PONCE.    TONSILLECTOMY          Social History     Socioeconomic History    Marital status:    Tobacco Use    Smoking status: Every Day     Current packs/day: 1.00     Average packs/day: 1 pack/day for 38.0 years (38.0 ttl pk-yrs)     Types: Cigarettes    Smokeless tobacco: Never   Substance and Sexual Activity    Alcohol use: Yes     Alcohol/week: 6.0 standard drinks of alcohol     Types: 6 Cans of beer per week     Comment: socially    Drug use: Not Currently     Types: Cocaine, Methamphetamines    Sexual activity: Yes     Partners: Female     Social Drivers of Health     Financial Resource Strain: High Risk (11/12/2024)    Received from [facility] SDOH Screening     In the past year, have you been unable to get any of the following when you really needed them? choose all that apply.: Clothing   Transportation Needs: High Risk (8/13/2024)    Received from [facility] SDOH Screening     Has lack of transportation kept you from medical appointments, meetings, work or from getting things needed for daily living? choose all that apply.: Yes, it has kept me from non-medical meetings, appointments, work or from getting things that i need     Has lack of transportation kept you from medical appointments, meetings, work or from getting  "things needed for daily living? choose all that apply.: Yes, it has kept me from medical appointments or from getting my medications   Housing Stability: High Risk (11/12/2024)    Received from [facility] SDOH Screening     In the past year, have you been unable to get any of the following when you really needed them? choose all that apply.: Utilities (electric, gas, and water)        Family History   Problem Relation Name Age of Onset    Diabetes Mother      ALS Father      Cancer Sister          Review of patient's allergies indicates:  No Known Allergies     Immunization History   Administered Date(s) Administered    COVID-19 Vaccine 03/03/2021, 03/31/2021, 12/21/2021    COVID-19, MRNA, LN-S, PF (MODERNA FULL 0.5 ML DOSE) 03/03/2021, 03/31/2021, 12/21/2021    Tdap 07/28/2021, 11/07/2023        Review of Systems   Constitutional: Negative.    HENT: Negative.     Eyes: Negative.    Respiratory: Negative.     Cardiovascular: Negative.    Gastrointestinal: Negative.    Genitourinary: Negative.    Musculoskeletal: Negative.    Skin: Negative.    Neurological: Negative.    Endo/Heme/Allergies: Negative.    Psychiatric/Behavioral: Negative.     All other systems reviewed and are negative.         Objective:      /74   Pulse 83   Temp 97.8 °F (36.6 °C) (Oral)   Resp 16   Ht 5' 5" (1.651 m)   Wt 65.8 kg (144 lb 15.2 oz)   BMI 24.12 kg/m²      Physical Exam  Vitals reviewed.   Constitutional:       General: He is not in acute distress.     Appearance: Normal appearance.   HENT:      Head: Normocephalic.      Right Ear: External ear normal.      Left Ear: External ear normal.      Nose: Nose normal.      Mouth/Throat:      Mouth: Mucous membranes are moist.      Pharynx: Oropharynx is clear.   Eyes:      General: No scleral icterus.     Extraocular Movements: Extraocular movements intact.      Conjunctiva/sclera: Conjunctivae normal.      Pupils: Pupils are equal, round, and reactive to light. "   Cardiovascular:      Rate and Rhythm: Normal rate and regular rhythm.      Pulses: Normal pulses.      Heart sounds: Normal heart sounds.   Pulmonary:      Effort: Pulmonary effort is normal. No respiratory distress.      Breath sounds: Normal breath sounds.   Abdominal:      General: Bowel sounds are normal. There is no distension.      Palpations: Abdomen is soft. There is no mass.      Tenderness: There is no abdominal tenderness. There is no right CVA tenderness or left CVA tenderness.      Hernia: No hernia is present.   Musculoskeletal:         General: No tenderness or signs of injury. Normal range of motion.      Cervical back: Normal range of motion and neck supple.      Right lower leg: No edema.      Left lower leg: No edema.   Lymphadenopathy:      Cervical: No cervical adenopathy.   Skin:     General: Skin is warm and dry.      Capillary Refill: Capillary refill takes less than 2 seconds.      Findings: No erythema or lesion.   Neurological:      General: No focal deficit present.      Mental Status: He is alert and oriented to person, place, and time. Mental status is at baseline.   Psychiatric:         Mood and Affect: Mood normal.         Behavior: Behavior normal.         Thought Content: Thought content normal.         Judgment: Judgment normal.          Labs:   Lab Results   Component Value Date    WBC 7.01 04/21/2025    HGB 14.9 04/21/2025    HCT 44.5 04/21/2025    MCV 97.8 (H) 04/21/2025     04/21/2025       CMP  Sodium   Date Value Ref Range Status   04/21/2025 139 136 - 145 mmol/L Final     Potassium   Date Value Ref Range Status   04/21/2025 4.1 3.5 - 5.1 mmol/L Final     Chloride   Date Value Ref Range Status   04/21/2025 106 98 - 107 mmol/L Final     CO2   Date Value Ref Range Status   04/21/2025 27 23 - 31 mmol/L Final     Glucose   Date Value Ref Range Status   04/21/2025 95 82 - 115 mg/dL Final     Blood Urea Nitrogen   Date Value Ref Range Status   04/21/2025 12.5 8.4 - 25.7  mg/dL Final     Creatinine   Date Value Ref Range Status   04/21/2025 0.85 0.72 - 1.25 mg/dL Final     Calcium   Date Value Ref Range Status   04/21/2025 9.3 8.8 - 10.0 mg/dL Final     Protein Total   Date Value Ref Range Status   04/21/2025 7.5 5.8 - 7.6 gm/dL Final     Albumin   Date Value Ref Range Status   04/21/2025 3.7 3.4 - 4.8 g/dL Final     Bilirubin Total   Date Value Ref Range Status   04/21/2025 0.6 <=1.5 mg/dL Final     ALP   Date Value Ref Range Status   04/21/2025 124 40 - 150 unit/L Final     AST   Date Value Ref Range Status   04/21/2025 16 11 - 45 unit/L Final     ALT   Date Value Ref Range Status   04/21/2025 14 0 - 55 unit/L Final     eGFR   Date Value Ref Range Status   04/21/2025 >60 mL/min/1.73/m2 Final     Comment:     Estimated GFR calculated using the CKD-EPI creatinine (2021) equation.     Lab Results   Component Value Date    TSH 2.150 11/02/2024     HCV Genotype   Date Value Ref Range Status   01/09/2025 Indeterminate (A) Undetected Final     Comment:     HCV RNA detected, but genotype could not be determined. HCV   genotype resolution testing is ordered.     -------------------ADDITIONAL INFORMATION-------------------  This test was performed using the [employer] RealTime HCV   Genotype II assay ([employer] [place], [place]).     Test Performed by:  [place] [address]  : Nicolasa Fall Ph.D.; CLIA# [id number]     HCV RNA Detect/Quant   Date Value Ref Range Status   04/21/2025 Undetected Undetected IU/mL Final     Comment:     Result in log IU/mL is Undetected.     -------------------ADDITIONAL INFORMATION-------------------  The quantification range of this assay is 15 to 100,000,000   IU/mL (1.18 log to 8.00 log IU/mL). Testing was performed   using the stephanie HCV test ([employer]     Test Performed by:  [facility]  [address]  [place] [address]  : Nicolasa Fall Ph.D.; CLIA# [id number]     HIV   Date Value Ref Range Status   01/17/2025 Nonreactive Nonreactive Final     PT   Date Value Ref Range Status   01/09/2025 13.1 11.4 - 14.0 seconds Final     INR   Date Value Ref Range Status   01/09/2025 1.0 <=1.3 Final     Syphilis Antibody   Date Value Ref Range Status   01/09/2025 Nonreactive Nonreactive, Equivocal Final     Hep BsAb Interp   Date Value Ref Range Status   01/09/2025 Nonreactive Nonreactive Final     Hep BsAb   Date Value Ref Range Status   01/09/2025 6.12 mIU/mL Final     Comment:     Interpretive Data Hep Bs Ab#  Result (mIU/mL)Interpretation - Hep B Surface Antibody  <8.00Nonreactive: Patient considered not immune to HBV infection  >=8.00 to <12.00Grayzone: Unable to determine immune status. Follow-up testing should be performed  >=12.00Reactive: Patient considered immune to HBV infection       Hep BcAb Interp   Date Value Ref Range Status   01/17/2025 Reactive (A) Nonreactive Final     Hep A IgG Interp   Date Value Ref Range Status   01/09/2025 Reactive (A) Nonreactive Final     FibroTest Stage   Date Value Ref Range Status   01/09/2025 F1  Final     ActiTest Grade   Date Value Ref Range Status   01/09/2025 A0  Final     Alpha-2-Macroglobulin   Date Value Ref Range Status   01/09/2025 272 100 - 280 mg/dL Final     Haptoglobin   Date Value Ref Range Status   01/09/2025 209 (H) 30 - 200 mg/dL Final     Alanine Aminotransferase (ALT)   Date Value Ref Range Status   01/09/2025 28 7 - 55 U/L Final     Gamma Glutamyltransferase (GGT)   Date Value Ref Range Status   01/09/2025 16 8 - 61 U/L Final     Bilirubin, Total   Date Value Ref Range Status   01/09/2025 0.3 0.0 - 1.2 mg/dL Final     Comment:        Test Performed by:  [facility] [address] [address]  : Nicolasa Fall Ph.D.; CLIA# [id number]     Test Performed by:  [facility]  [address] [place] [address]  : Nicolasa Fall Ph.D.; CLIA# [id number]     Ferritin Level   Date Value Ref Range Status   01/17/2025 344.90 (H) 21.81 - 274.66 ng/mL Final     Antinuclear Ab, HEp-2 Substrate   Date Value Ref Range Status   01/09/2025 See Scanned Result  Final       Imaging: Reviewed most recent relevant imaging studies available, notable results highlighted in this note      Medications:     Current Outpatient Medications   Medication Instructions    albuterol (PROVENTIL HFA) 90 mcg/actuation inhaler 2 puffs, Inhalation, Every 6 hours PRN, Rescue    cetirizine (ZYRTEC) 10 mg, Oral, Daily    gabapentin (NEURONTIN) 300 MG capsule take one capsule 3 times daily    GEMTESA 75 mg Tab 1 tablet    ibuprofen (ADVIL,MOTRIN) 800 mg, Oral, Every 6 hours PRN    lisinopriL 10 mg, Oral       Assessment:       1. Chronic hepatitis C without hepatic coma  -     Hepatitis C RNA, Quantitative, PCR; Future; Expected date: 07/08/2025    2. Hepatic steatosis    3. Hypertension, unspecified type    4. LAUREN positive  -     LAUREN IgG by IFA; Future; Expected date: 07/08/2025    5. Cigarette nicotine dependence without complication          Plan:      Chronic hepatitis C without hepatic coma  -     Hepatitis C RNA, Quantitative, PCR; Future; Expected date: 07/08/2025  Continue Mavyret as directed   Start date 2/20/25  Projected end date 4/14/25  Labs today . I will call with results.   To minimize risk of re-infection or progression of liver damage please consider the following recommendations.   Do not share a razor, toothbrush, needle, clippers with anyone.   Avoid all illicit drugs.  Minimize alcohol intake.   Hep C antibody will be positive for life, but does not provide immunity.  If you need repeat testing for Hepatitis C reinfection, will require Hepatitis C Viral Load (RNA) test.  Do not donate blood.  RTC PRN.   Follow-up with PCP for routine  medical needs.     Hepatic steatosis   11/27/24  Goal BMI < 25.   Limit consumption of high fat foods, high sugar foods, and salt.   Avoid alcohol and illicit drug use.  Increase exercise activity; 30 minutes of strenuous activity 3-5 x week.     Hypertension, unspecified type  Medication compliance encouraged.  BP goal <130/80. Monitor BP at home & keep log of readings.  Low sodium diet, max 2 grams per day.  Weight control recommended.  Avoid illicit drug use and excessive alcohol intake.  Increase exercise activity, goal 30 minutes moderate exertion 3-5 times per week.  Stress reduction strategies such as meditation, deep breathing, stretching, prayer, social support system.      LAUREN positive  -     LAUREN IgG by IFA; Future; Expected date: 07/08/2025  Repeat LAUREN. If positive, refer to rheumatology    Cigarette nicotine dependence without complication  Spent approx 3 minutes discussing smoking cessation   Pt is not ready to quit.  Discussed benefits of quitting: improved overall health, decreased cardiac/vascular/pulmonary/stroke risks, as well as cost savings.         20 minutes of total time spent on the encounter, which includes face to face time and non-face to face time preparing to see the patient (eg, review of tests), Obtaining and/or reviewing separately obtained history, Documenting clinical information in the electronic or other health record, Independently interpreting results (not separately reported) and communicating results to the patient/family/caregiver, or Care coordination (not separately reported).

## 2025-07-09 LAB
ANA SER QL HEP2 SUBST: NORMAL
HCV RNA SERPL NAA+PROBE-ACNC: NOT DETECTED K[IU]/ML
HCV RNA SERPL NAA+PROBE-LOG IU: NOT DETECTED {LOG_IU}/ML

## 2025-07-10 ENCOUNTER — TELEPHONE (OUTPATIENT)
Dept: INFECTIOUS DISEASES | Facility: CLINIC | Age: 65
End: 2025-07-10
Payer: MEDICARE

## 2025-07-10 NOTE — TELEPHONE ENCOUNTER
----- Message from ANDREW Mcgill sent at 7/10/2025  3:42 PM CDT -----  Reviewed labs and tried to contact patient. LAUREN is negative and SVR 12 Hep C VL is not detected. I tried to call the patient with results. No answer.  Message left with a female for him to call me   back.  Please call patient tomorrow with the following:   Congratulations on your Hepatitis C cure, to minimize risk of re-infection or progression of liver damage please consider the following recommendations.   Do not share a razor, toothbrush, needle, clippers with anyone.   Avoid all illicit drugs.  Minimize alcohol intake.   Hep C antibody will be positive for life, but does not provide immunity.  If you need repeat testing for Hepatitis C reinfection, will require Hepatitis C Viral Load (RNA) test.  Do not donate blood.  RTC PRN.   Follow-up with PCP for routine medical needs.   ----- Message -----  From: Lab, Background User  Sent: 7/9/2025   9:46 AM CDT  To: ANDREW Bonilla